# Patient Record
Sex: MALE | Race: WHITE | NOT HISPANIC OR LATINO | Employment: FULL TIME | URBAN - METROPOLITAN AREA
[De-identification: names, ages, dates, MRNs, and addresses within clinical notes are randomized per-mention and may not be internally consistent; named-entity substitution may affect disease eponyms.]

---

## 2021-12-05 ENCOUNTER — HOSPITAL ENCOUNTER (EMERGENCY)
Facility: HOSPITAL | Age: 46
Discharge: HOME | End: 2021-12-05
Attending: EMERGENCY MEDICINE
Payer: COMMERCIAL

## 2021-12-05 VITALS
SYSTOLIC BLOOD PRESSURE: 157 MMHG | HEART RATE: 84 BPM | BODY MASS INDEX: 27.32 KG/M2 | TEMPERATURE: 98.9 F | HEIGHT: 66 IN | DIASTOLIC BLOOD PRESSURE: 87 MMHG | OXYGEN SATURATION: 97 % | WEIGHT: 170 LBS | RESPIRATION RATE: 18 BRPM

## 2021-12-05 DIAGNOSIS — U07.1 COVID-19: Primary | ICD-10-CM

## 2021-12-05 PROCEDURE — 99282 EMERGENCY DEPT VISIT SF MDM: CPT | Performed by: PHYSICIAN ASSISTANT

## 2021-12-05 PROCEDURE — 99283 EMERGENCY DEPT VISIT LOW MDM: CPT

## 2022-05-15 ENCOUNTER — APPOINTMENT (EMERGENCY)
Dept: CT IMAGING | Facility: HOSPITAL | Age: 47
End: 2022-05-15
Payer: COMMERCIAL

## 2022-05-15 ENCOUNTER — HOSPITAL ENCOUNTER (EMERGENCY)
Facility: HOSPITAL | Age: 47
Discharge: HOME | End: 2022-05-15
Attending: EMERGENCY MEDICINE | Admitting: EMERGENCY MEDICINE
Payer: COMMERCIAL

## 2022-05-15 VITALS
HEART RATE: 85 BPM | TEMPERATURE: 98.4 F | SYSTOLIC BLOOD PRESSURE: 155 MMHG | DIASTOLIC BLOOD PRESSURE: 82 MMHG | RESPIRATION RATE: 19 BRPM | OXYGEN SATURATION: 98 %

## 2022-05-15 DIAGNOSIS — S39.012A STRAIN OF LUMBAR REGION, INITIAL ENCOUNTER: ICD-10-CM

## 2022-05-15 DIAGNOSIS — M54.50 ACUTE LOW BACK PAIN: Primary | ICD-10-CM

## 2022-05-15 DIAGNOSIS — R79.89 ELEVATED SERUM CREATININE: ICD-10-CM

## 2022-05-15 LAB
ALBUMIN SERPL BCP-MCNC: 4 G/DL (ref 3.5–5)
ALP SERPL-CCNC: 45 U/L (ref 46–116)
ALT SERPL W P-5'-P-CCNC: 63 U/L (ref 12–78)
ANION GAP SERPL CALCULATED.3IONS-SCNC: 8 MMOL/L (ref 4–13)
AST SERPL W P-5'-P-CCNC: 31 U/L (ref 5–45)
BASOPHILS # BLD AUTO: 0.04 THOUSANDS/ΜL (ref 0–0.1)
BASOPHILS NFR BLD AUTO: 1 % (ref 0–1)
BILIRUB SERPL-MCNC: 0.41 MG/DL (ref 0.2–1)
BILIRUB UR QL STRIP: NEGATIVE
BUN SERPL-MCNC: 24 MG/DL (ref 5–25)
CALCIUM SERPL-MCNC: 9.5 MG/DL (ref 8.3–10.1)
CHLORIDE SERPL-SCNC: 102 MMOL/L (ref 100–108)
CLARITY UR: CLEAR
CO2 SERPL-SCNC: 26 MMOL/L (ref 21–32)
COLOR UR: YELLOW
CREAT SERPL-MCNC: 1.31 MG/DL (ref 0.6–1.3)
EOSINOPHIL # BLD AUTO: 0.15 THOUSAND/ΜL (ref 0–0.61)
EOSINOPHIL NFR BLD AUTO: 3 % (ref 0–6)
ERYTHROCYTE [DISTWIDTH] IN BLOOD BY AUTOMATED COUNT: 11.8 % (ref 11.6–15.1)
GFR SERPL CREATININE-BSD FRML MDRD: 63 ML/MIN/1.73SQ M
GLUCOSE SERPL-MCNC: 111 MG/DL (ref 65–140)
GLUCOSE UR STRIP-MCNC: NEGATIVE MG/DL
HCT VFR BLD AUTO: 42.6 % (ref 36.5–49.3)
HGB BLD-MCNC: 15.3 G/DL (ref 12–17)
HGB UR QL STRIP.AUTO: NEGATIVE
IMM GRANULOCYTES # BLD AUTO: 0.02 THOUSAND/UL (ref 0–0.2)
IMM GRANULOCYTES NFR BLD AUTO: 0 % (ref 0–2)
KETONES UR STRIP-MCNC: NEGATIVE MG/DL
LEUKOCYTE ESTERASE UR QL STRIP: NEGATIVE
LIPASE SERPL-CCNC: 127 U/L (ref 73–393)
LYMPHOCYTES # BLD AUTO: 1.28 THOUSANDS/ΜL (ref 0.6–4.47)
LYMPHOCYTES NFR BLD AUTO: 26 % (ref 14–44)
MCH RBC QN AUTO: 31.4 PG (ref 26.8–34.3)
MCHC RBC AUTO-ENTMCNC: 35.9 G/DL (ref 31.4–37.4)
MCV RBC AUTO: 88 FL (ref 82–98)
MONOCYTES # BLD AUTO: 0.48 THOUSAND/ΜL (ref 0.17–1.22)
MONOCYTES NFR BLD AUTO: 10 % (ref 4–12)
NEUTROPHILS # BLD AUTO: 3.05 THOUSANDS/ΜL (ref 1.85–7.62)
NEUTS SEG NFR BLD AUTO: 60 % (ref 43–75)
NITRITE UR QL STRIP: NEGATIVE
NRBC BLD AUTO-RTO: 0 /100 WBCS
PH UR STRIP.AUTO: 6.5 [PH]
PLATELET # BLD AUTO: 229 THOUSANDS/UL (ref 149–390)
PMV BLD AUTO: 8.5 FL (ref 8.9–12.7)
POTASSIUM SERPL-SCNC: 4.2 MMOL/L (ref 3.5–5.3)
PROT SERPL-MCNC: 7 G/DL (ref 6.4–8.2)
PROT UR STRIP-MCNC: NEGATIVE MG/DL
RBC # BLD AUTO: 4.87 MILLION/UL (ref 3.88–5.62)
SODIUM SERPL-SCNC: 136 MMOL/L (ref 136–145)
SP GR UR STRIP.AUTO: 1.01 (ref 1–1.03)
UROBILINOGEN UR QL STRIP.AUTO: 0.2 E.U./DL
WBC # BLD AUTO: 5.02 THOUSAND/UL (ref 4.31–10.16)

## 2022-05-15 PROCEDURE — 80053 COMPREHEN METABOLIC PANEL: CPT | Performed by: PHYSICIAN ASSISTANT

## 2022-05-15 PROCEDURE — 85025 COMPLETE CBC W/AUTO DIFF WBC: CPT | Performed by: PHYSICIAN ASSISTANT

## 2022-05-15 PROCEDURE — 81003 URINALYSIS AUTO W/O SCOPE: CPT | Performed by: PHYSICIAN ASSISTANT

## 2022-05-15 PROCEDURE — 99285 EMERGENCY DEPT VISIT HI MDM: CPT | Performed by: PHYSICIAN ASSISTANT

## 2022-05-15 PROCEDURE — 96375 TX/PRO/DX INJ NEW DRUG ADDON: CPT

## 2022-05-15 PROCEDURE — 36415 COLL VENOUS BLD VENIPUNCTURE: CPT | Performed by: PHYSICIAN ASSISTANT

## 2022-05-15 PROCEDURE — 96374 THER/PROPH/DIAG INJ IV PUSH: CPT

## 2022-05-15 PROCEDURE — 99284 EMERGENCY DEPT VISIT MOD MDM: CPT

## 2022-05-15 PROCEDURE — 74176 CT ABD & PELVIS W/O CONTRAST: CPT

## 2022-05-15 PROCEDURE — 83690 ASSAY OF LIPASE: CPT | Performed by: PHYSICIAN ASSISTANT

## 2022-05-15 RX ORDER — FENTANYL CITRATE 50 UG/ML
50 INJECTION, SOLUTION INTRAMUSCULAR; INTRAVENOUS ONCE
Status: COMPLETED | OUTPATIENT
Start: 2022-05-15 | End: 2022-05-15

## 2022-05-15 RX ORDER — METHOCARBAMOL 500 MG/1
500 TABLET, FILM COATED ORAL ONCE
Status: COMPLETED | OUTPATIENT
Start: 2022-05-15 | End: 2022-05-15

## 2022-05-15 RX ORDER — KETOROLAC TROMETHAMINE 30 MG/ML
15 INJECTION, SOLUTION INTRAMUSCULAR; INTRAVENOUS ONCE
Status: COMPLETED | OUTPATIENT
Start: 2022-05-15 | End: 2022-05-15

## 2022-05-15 RX ORDER — METHOCARBAMOL 500 MG/1
500 TABLET, FILM COATED ORAL 2 TIMES DAILY
Qty: 20 TABLET | Refills: 0 | Status: SHIPPED | OUTPATIENT
Start: 2022-05-15

## 2022-05-15 RX ORDER — NAPROXEN 500 MG/1
500 TABLET ORAL 2 TIMES DAILY WITH MEALS
Qty: 30 TABLET | Refills: 0 | Status: SHIPPED | OUTPATIENT
Start: 2022-05-15

## 2022-05-15 RX ORDER — LIDOCAINE 50 MG/G
1 PATCH TOPICAL DAILY
Qty: 15 PATCH | Refills: 0 | Status: SHIPPED | OUTPATIENT
Start: 2022-05-15

## 2022-05-15 RX ADMIN — FENTANYL CITRATE 50 MCG: 50 INJECTION, SOLUTION INTRAMUSCULAR; INTRAVENOUS at 22:10

## 2022-05-15 RX ADMIN — METHOCARBAMOL 500 MG: 500 TABLET ORAL at 22:10

## 2022-05-15 RX ADMIN — KETOROLAC TROMETHAMINE 15 MG: 30 INJECTION, SOLUTION INTRAMUSCULAR at 22:10

## 2022-05-15 NOTE — Clinical Note
Christa Lopez was seen and treated in our emergency department on 5/15/2022  Diagnosis:     Mandie Henriquez  may return to work on return date  He may return on this date: 05/18/2022         If you have any questions or concerns, please don't hesitate to call        Kodi Shukla PA-C    ______________________________           _______________          _______________  Hospital Representative                              Date                                Time

## 2022-05-16 ENCOUNTER — NURSE TRIAGE (OUTPATIENT)
Dept: PHYSICAL THERAPY | Facility: OTHER | Age: 47
End: 2022-05-16

## 2022-05-16 DIAGNOSIS — M54.50 ACUTE RIGHT-SIDED LOW BACK PAIN, UNSPECIFIED WHETHER SCIATICA PRESENT: Primary | ICD-10-CM

## 2022-05-16 NOTE — TELEPHONE ENCOUNTER
Additional Information   Negative: Is this related to a work injury?  Negative: Is this related to an MVA?  Negative: Are you currently recieving homecare services? Background - Initial Assessment  Clinical complaint: Pain is right sided low back pain and radiates to right hip, and center tailbone area  Patient states he has minimal if any pain on the left side  Radiates down right leg  Feeling like a spasm  Pain started 2 weeks, however 5/15/22 pain increased  [facility] patient states it may have been from 3 weeks ago he tripped over sticks, and had more bending at work  Was seen in the ED for this pain  Was in PT years ago for high back pain     Date of onset: 5/2/22  Frequency of pain: constant  Quality of pain: sharp and stabbing    Protocols used: SL AMB COMPREHENSIVE SPINE PROGRAM PROTOCOL

## 2022-05-16 NOTE — ED PROVIDER NOTES
History  Chief Complaint   Patient presents with    Back Pain     Patient c/o right, lower sided back pain x 2 weeks, worse in the last 2 days  Hx of thoracic herniated disc  Patient is a 68-year-old male presenting to ED for evaluation of low back pain x2 weeks  Patient states that the pain is primarily in the right low back and radiates into the right hip and groin  He denies any falls, trauma, injuries or heavy lifting  The pain has been worsening over the past 2 days and is not relieved with Tylenol/Motrin  He also notes some pain in the right flank and right lower abdomen and is concerned about his kidney  He denies any history of kidney stones or kidney disease  He denies any fevers, chills, dysuria, hematuria, urgency or frequency  He denies any nausea, vomiting, diarrhea, constipation, penile discharge or testicular pain/swelling  He denies any numbness/weakness in lower extremities, bowel/bladder incontinence, urinary retention or saddle anesthesia  None       Past Medical History:   Diagnosis Date    Asthma        History reviewed  No pertinent surgical history  History reviewed  No pertinent family history  I have reviewed and agree with the history as documented  E-Cigarette/Vaping     E-Cigarette/Vaping Substances     Social History     Tobacco Use    Smoking status: Never Smoker    Smokeless tobacco: Never Used   Substance Use Topics    Alcohol use: Not Currently    Drug use: Not Currently       Review of Systems   Constitutional: Negative for chills, diaphoresis, fatigue and fever  HENT: Negative for congestion, ear pain, mouth sores, rhinorrhea, sinus pain, sore throat and trouble swallowing  Eyes: Negative for photophobia and visual disturbance  Respiratory: Negative for cough, chest tightness, shortness of breath and wheezing  Cardiovascular: Negative for chest pain, palpitations and leg swelling  Gastrointestinal: Positive for abdominal pain   Negative for blood in stool, constipation, diarrhea, nausea and vomiting  Genitourinary: Negative for dysuria, flank pain, frequency, hematuria and urgency  Musculoskeletal: Positive for arthralgias (Right hip pain ) and back pain  Negative for gait problem, joint swelling, myalgias and neck pain  Skin: Negative for color change, pallor and rash  Neurological: Negative for dizziness, syncope, speech difficulty, weakness, light-headedness, numbness and headaches  Psychiatric/Behavioral: Negative for confusion and sleep disturbance  All other systems reviewed and are negative  Physical Exam  Physical Exam  Vitals and nursing note reviewed  Constitutional:       General: He is awake  He is not in acute distress  Appearance: Normal appearance  He is well-developed  He is not ill-appearing or diaphoretic  HENT:      Head: Normocephalic and atraumatic  Right Ear: External ear normal       Left Ear: External ear normal       Nose: Nose normal       Mouth/Throat:      Lips: Pink  Mouth: Mucous membranes are moist    Eyes:      General: Lids are normal  No scleral icterus  Conjunctiva/sclera: Conjunctivae normal       Pupils: Pupils are equal, round, and reactive to light  Cardiovascular:      Rate and Rhythm: Normal rate and regular rhythm  Pulses: Normal pulses  Radial pulses are 2+ on the right side and 2+ on the left side  Heart sounds: Normal heart sounds, S1 normal and S2 normal    Pulmonary:      Effort: Pulmonary effort is normal  No accessory muscle usage  Breath sounds: Normal breath sounds  No stridor  No decreased breath sounds, wheezing, rhonchi or rales  Abdominal:      General: Abdomen is flat  Bowel sounds are normal  There is no distension  Palpations: Abdomen is soft  Tenderness: There is abdominal tenderness in the right lower quadrant  There is no right CVA tenderness, left CVA tenderness, guarding or rebound        Comments: Mild tenderness over the right mid and lower quadrants  No rebound tenderness, guarding or rigidity  Normal bowel sounds throughout  Musculoskeletal:      Cervical back: Normal, full passive range of motion without pain, normal range of motion and neck supple  No signs of trauma  No pain with movement  Normal range of motion  Thoracic back: Normal       Lumbar back: Spasms present  Positive right straight leg raise test  Negative left straight leg raise test       Right lower leg: No edema  Left lower leg: No edema  Comments: Tenderness to palpation of right-sided paraspinal muscles  No midline tenderness, deformities or step-offs  Strength 5/5 in bilateral lower extremities  Sensation intact, neurovascularly intact  Lymphadenopathy:      Cervical: No cervical adenopathy  Skin:     General: Skin is warm and dry  Capillary Refill: Capillary refill takes less than 2 seconds  Coloration: Skin is not cyanotic, jaundiced or pale  Neurological:      Mental Status: He is alert and oriented to person, place, and time  GCS: GCS eye subscore is 4  GCS verbal subscore is 5  GCS motor subscore is 6  Cranial Nerves: No dysarthria or facial asymmetry  Gait: Gait normal    Psychiatric:         Attention and Perception: Attention normal          Mood and Affect: Mood normal          Speech: Speech normal          Behavior: Behavior normal  Behavior is cooperative           Vital Signs  ED Triage Vitals   Temperature Pulse Respirations Blood Pressure SpO2   05/15/22 2047 05/15/22 2047 05/15/22 2047 05/15/22 2047 05/15/22 2047   98 4 °F (36 9 °C) 85 19 155/82 98 %      Temp Source Heart Rate Source Patient Position - Orthostatic VS BP Location FiO2 (%)   05/15/22 2047 05/15/22 2047 05/15/22 2047 05/15/22 2047 --   Oral Monitor Sitting Left arm       Pain Score       05/15/22 2210       10 - Worst Possible Pain           Vitals:    05/15/22 2047   BP: 155/82   Pulse: 85   Patient Position - Orthostatic VS: Sitting         Visual Acuity      ED Medications  Medications   ketorolac (TORADOL) injection 15 mg (15 mg Intravenous Given 5/15/22 2210)   methocarbamol (ROBAXIN) tablet 500 mg (500 mg Oral Given 5/15/22 2210)   fentanyl citrate (PF) 100 MCG/2ML 50 mcg (50 mcg Intravenous Given 5/15/22 2210)       Diagnostic Studies  Results Reviewed     Procedure Component Value Units Date/Time    UA w Reflex to Microscopic w Reflex to Culture [[id number]] Collected: 05/15/22 2255    Lab Status: Final result Specimen: Urine, Clean Catch Updated: 05/15/22 2301     Color, UA Yellow     Clarity, UA Clear     Specific Gravity, UA 1 015     pH, UA 6 5     Leukocytes, UA Negative     Nitrite, UA Negative     Protein, UA Negative mg/dl      Glucose, UA Negative mg/dl      Ketones, UA Negative mg/dl      Urobilinogen, UA 0 2 E U /dl      Bilirubin, UA Negative     Blood, UA Negative    Lipase [[id number]]  (Normal) Collected: 05/15/22 2207    Lab Status: Final result Specimen: Blood from Arm, Right Updated: 05/15/22 2233     Lipase 127 u/L     Comprehensive metabolic panel [[id number]]  (Abnormal) Collected: 05/15/22 2207    Lab Status: Final result Specimen: Blood from Arm, Right Updated: 05/15/22 2233     Sodium 136 mmol/L      Potassium 4 2 mmol/L      Chloride 102 mmol/L      CO2 26 mmol/L      ANION GAP 8 mmol/L      BUN 24 mg/dL      Creatinine 1 31 mg/dL      Glucose 111 mg/dL      Calcium 9 5 mg/dL      AST 31 U/L      ALT 63 U/L      Alkaline Phosphatase 45 U/L      Total Protein 7 0 g/dL      Albumin 4 0 g/dL      Total Bilirubin 0 41 mg/dL      eGFR 63 ml/min/1 73sq m     Narrative:      Jason guidelines for Chronic Kidney Disease (CKD):     Stage 1 with normal or high GFR (GFR > 90 mL/min/1 73 square meters)    Stage 2 Mild CKD (GFR = 60-89 mL/min/1 73 square meters)    Stage 3A Moderate CKD (GFR = 45-59 mL/min/1 73 square meters)    Stage 3B Moderate CKD (GFR = 30-44 mL/min/1 73 square meters)    Stage 4 Severe CKD (GFR = 15-29 mL/min/1 73 square meters)    Stage 5 End Stage CKD (GFR <15 mL/min/1 73 square meters)  Note: GFR calculation is accurate only with a steady state creatinine    CBC and differential [[id number]]  (Abnormal) Collected: 05/15/22 2207    Lab Status: Final result Specimen: Blood from Arm, Right Updated: 05/15/22 2220     WBC 5 02 Thousand/uL      RBC 4 87 Million/uL      Hemoglobin 15 3 g/dL      Hematocrit 42 6 %      MCV 88 fL      MCH 31 4 pg      MCHC 35 9 g/dL      RDW 11 8 %      MPV 8 5 fL      Platelets 208 Thousands/uL      nRBC 0 /100 WBCs      Neutrophils Relative 60 %      Immat GRANS % 0 %      Lymphocytes Relative 26 %      Monocytes Relative 10 %      Eosinophils Relative 3 %      Basophils Relative 1 %      Neutrophils Absolute 3 05 Thousands/µL      Immature Grans Absolute 0 02 Thousand/uL      Lymphocytes Absolute 1 28 Thousands/µL      Monocytes Absolute 0 48 Thousand/µL      Eosinophils Absolute 0 15 Thousand/µL      Basophils Absolute 0 04 Thousands/µL                  CT abdomen pelvis wo contrast   Final Result by Meg Singh MD (05/15 2329)      Normal appendix  No evidence of acute intra-abdominal or pelvic pathology            Workstation performed: [id number]         CT recon only lumbar spine   Final Result by Meg Singh MD (05/15 1376)      No fracture or traumatic subluxation  Workstation performed: [id number]                    Procedures  Procedures         ED Course  ED Course as of 05/16/22 1240   Sun May 15, 2022   2243 Creatinine(!): 1 31  Prior from 11/2021 was 1 1  SBIRT 20yo+    Flowsheet Row Most Recent Value   SBIRT (25 yo +)    In order to provide better care to our patients, we are screening all of our patients for alcohol and drug use  Would it be okay to ask you these screening questions? Yes Filed at: 05/15/2022 2119   Initial Alcohol Screen: US AUDIT-C     1   How often do you have a drink containing alcohol? 0 Filed at: 05/15/2022 2119   2  How many drinks containing alcohol do you have on a typical day you are drinking? 0 Filed at: 05/15/2022 2119   3a  Male UNDER 65: How often do you have five or more drinks on one occasion? 0 Filed at: 05/15/2022 2119   3b  FEMALE Any Age, or MALE 65+: How often do you have 4 or more drinks on one occassion? 0 Filed at: 05/15/2022 2119   Audit-C Score 0 Filed at: 05/15/2022 2119   CHENG: How many times in the past year have you    Used an illegal drug or used a prescription medication for non-medical reasons? Never Filed at: 05/15/2022 2119                    MDM  Number of Diagnoses or Management Options  Acute low back pain  Elevated serum creatinine  Strain of lumbar region, initial encounter  Diagnosis management comments: Patient is a 24-year-old male presenting to ED for evaluation of low back pain x2 weeks  Labs notable for a mild elevation in creatinine but are otherwise unremarkable  Urine shows no evidence of infection or RBCs  CT abdomen/pelvis and lumbar spine showed no acute abnormalities  Patient had some improvement after medications given in the ED  Will provide Comprehensive Spine program referral and send additional pain medications and muscle relaxers to patient's pharmacy  Advised prompt follow-up with PCP or return to the ED for new/worsening symptoms  A work note was provided at patient's request  We discussed the symptoms which are most concerning (saddle anesthesia, urinary or bowel incontinence or retention, changing or worsening pain) that necessitate immediate return  The management plan was discussed in detail with the patient at bedside and all questions were answered  Strict ED return instructions were discussed at bedside  Prior to discharge, both verbal and written instructions were provided  We discussed the signs and symptoms that should prompt the patient to return to the ED   All questions were answered and the patient was comfortable with the plan of care and discharged home  The patient agrees to return to the Emergency Department for concerns and/or progression of illness  Amount and/or Complexity of Data Reviewed  Clinical lab tests: ordered and reviewed  Tests in the radiology section of CPT®: reviewed and ordered    Patient Progress  Patient progress: stable      Disposition  Final diagnoses:   Acute low back pain   Strain of lumbar region, initial encounter   Elevated serum creatinine     Time reflects when diagnosis was documented in both MDM as applicable and the Disposition within this note     Time User Action Codes Description Comment    5/15/2022 11:40 PM Jayashree Mcgarry [M54 [id number]] Acute low back pain     5/15/2022 11:40 PM Jayashree Mcgarry [S39 012A] Strain of lumbar region, initial encounter     5/15/2022 11:40 PM Jayashree Mcgarry [[id number]] Elevated serum creatinine       ED Disposition     ED Disposition   Discharge    Condition   Stable    Date/Time   Sun May 15, 2022 11:40 PM    Comment   Darylene Holts discharge to home/self care                 Follow-up Information     Follow up With Specialties Details Why Contact Info Additional [address], DO Family Medicine Schedule an appointment as soon as possible for a visit   [address] [facility] [address] [facility] [address]  [phone]       [facility] Comprehensive Spine Program Physical Therapy Schedule an appointment as soon as possible for a visit   [phone]    [facility] [address] [facility] Emergency Department Emergency Medicine  If symptoms worsen [address] Emergency Department, [address] [facility], [address] [place] [address], [facility]          Discharge Medication List as of 5/15/2022 11:43 PM      START taking these medications    Details   lidocaine (Lidoderm) 5 % Apply 1 patch topically in the morning  Remove & Discard patch within 12 hours or as directed by MD , Starting Sun 5/15/2022, Normal      methocarbamol (ROBAXIN) 500 mg tablet Take 1 tablet (500 mg total) by mouth in the morning and 1 tablet (500 mg total) in the evening , Starting Sun 5/15/2022, Normal      naproxen (Naprosyn) 500 mg tablet Take 1 tablet (500 mg total) by mouth in the morning and 1 tablet (500 mg total) in the evening  Take with meals  , Starting Sun 5/15/2022, Normal                 PDMP Review     None          ED Provider  Electronically Signed by           Clara German PA-C  05/16/22 6055

## 2022-05-16 NOTE — TELEPHONE ENCOUNTER
Additional Information   Negative: Has the patient had unexplained weight loss?  Negative: Does the patient have a fever?  Negative: Is the patient experiencing blood in sputum?  Negative: Is the patient experiencing urine retention?  Negative: Is the patient experiencing acute drop foot or paralysis?  Negative: Has the patient experienced major trauma? (fall from height, high speed collision, direct blow to spine) and is also experiencing nausea, light-headedness, or loss of consciousness?  Negative: Is this a chronic condition? Protocols used: SL AMB COMPREHENSIVE SPINE PROGRAM PROTOCOL    This RN did review in detail the Comprehensive Spine Program and what we can provide for their back pain  Patient is agreeable to being triaged by this RN and would like to proceed with Physical Therapy  Referral was placed for Physical Therapy at [facility]  Patients information was sent to the  to make evaluation appointment  Patient made aware that the PT office  will be calling to schedule the appointment  Patient was provided with the phone number to the PT office  No further questions and/or concerns were voiced by the patient at this time  Patient states understanding of the referral that was placed

## 2022-09-30 ENCOUNTER — TELEPHONE (OUTPATIENT)
Dept: UROLOGY | Facility: CLINIC | Age: 47
End: 2022-09-30

## 2022-09-30 ENCOUNTER — OFFICE VISIT (OUTPATIENT)
Dept: UROLOGY | Facility: CLINIC | Age: 47
End: 2022-09-30
Payer: COMMERCIAL

## 2022-09-30 VITALS
HEIGHT: 66 IN | HEART RATE: 68 BPM | SYSTOLIC BLOOD PRESSURE: 122 MMHG | WEIGHT: 174 LBS | BODY MASS INDEX: 27.97 KG/M2 | OXYGEN SATURATION: 99 % | DIASTOLIC BLOOD PRESSURE: 76 MMHG

## 2022-09-30 DIAGNOSIS — R39.15 URINARY URGENCY: Primary | ICD-10-CM

## 2022-09-30 DIAGNOSIS — G47.33 OSA (OBSTRUCTIVE SLEEP APNEA): ICD-10-CM

## 2022-09-30 DIAGNOSIS — Z12.5 SCREENING FOR PROSTATE CANCER: ICD-10-CM

## 2022-09-30 LAB
POST-VOID RESIDUAL VOLUME, ML POC: 225 ML
SL AMB  POCT GLUCOSE, UA: NORMAL
SL AMB LEUKOCYTE ESTERASE,UA: NORMAL
SL AMB POCT BILIRUBIN,UA: NORMAL
SL AMB POCT BLOOD,UA: NORMAL
SL AMB POCT CLARITY,UA: CLEAR
SL AMB POCT COLOR,UA: NORMAL
SL AMB POCT KETONES,UA: NORMAL
SL AMB POCT NITRITE,UA: NORMAL
SL AMB POCT PH,UA: 6.5
SL AMB POCT SPECIFIC GRAVITY,UA: 1
SL AMB POCT URINE PROTEIN: NORMAL
SL AMB POCT UROBILINOGEN: 0.2

## 2022-09-30 PROCEDURE — 81002 URINALYSIS NONAUTO W/O SCOPE: CPT | Performed by: PHYSICIAN ASSISTANT

## 2022-09-30 PROCEDURE — 99204 OFFICE O/P NEW MOD 45 MIN: CPT | Performed by: PHYSICIAN ASSISTANT

## 2022-09-30 PROCEDURE — 51798 US URINE CAPACITY MEASURE: CPT | Performed by: PHYSICIAN ASSISTANT

## 2022-09-30 RX ORDER — LIDOCAINE HYDROCHLORIDE 40 MG/ML
1 SOLUTION TOPICAL AS NEEDED
COMMUNITY
Start: 2021-10-03 | End: 2022-10-03

## 2022-09-30 RX ORDER — FENOFIBRATE 145 MG/1
TABLET, COATED ORAL DAILY
COMMUNITY
Start: 2022-08-01

## 2022-09-30 RX ORDER — FLUTICASONE FUROATE 100 UG/1
POWDER RESPIRATORY (INHALATION) DAILY
COMMUNITY
Start: 2022-06-23

## 2022-09-30 RX ORDER — OLOPATADINE HYDROCHLORIDE 2 MG/ML
SOLUTION/ DROPS OPHTHALMIC
COMMUNITY
Start: 2022-09-22

## 2022-09-30 RX ORDER — ALBUTEROL SULFATE 2.5 MG/3ML
2.5 SOLUTION RESPIRATORY (INHALATION) AS NEEDED
COMMUNITY
Start: 2021-10-05 | End: 2022-10-05

## 2022-09-30 RX ORDER — OXYBUTYNIN CHLORIDE 10 MG/1
10 TABLET, EXTENDED RELEASE ORAL
COMMUNITY

## 2022-09-30 RX ORDER — FLUTICASONE PROPIONATE 50 MCG
SPRAY, SUSPENSION (ML) NASAL DAILY
COMMUNITY
Start: 2022-08-01

## 2022-09-30 NOTE — TELEPHONE ENCOUNTER
UDS scheduled for 1/5/23 @ [address] [facility] office  Please confirm with patient

## 2022-09-30 NOTE — TELEPHONE ENCOUNTER
Return for cysto and UDS     Need UDS appt please   Will schedule cysto after UDS along with result appt -

## 2022-09-30 NOTE — PROGRESS NOTES
9/30/2022      Chief Complaint   Patient presents with    Urinary Urgency         Assessment and Plan    50 y o  male -- New patient    1  Difficulty urinating  2  Incomplete bladder emptying  - AUA today 23   - UA today negative for nitrites, leukocytes, blood  - PVR today 225 mL, repeat 59 mL  - Discussed conservative measures  - Recommend cystoscopy and UDS for further workup  - Call with any questions or concerns in the meantime  - All questions answered; patient understands and agrees with plan    3  Prostate cancer screening   - PSA 2020 was 0 37  - SANAZ today benign  - PSA in near future    4  ARELIS on CPAP      History of Present Illness  Wagner Barbour is a 50 y o  male new patient here for evaluation of urinary frequency  Patient has always had difficulty urinating  States he urinates 15-20 times per day and multiple times per hour  Has previously seen [facility]  Currently taking oxybutynin 10 mg daily for the past 6 years  States he never feels empty when done urinating and feels as if he needs to do Kegel exercises to urinate  Denies gross hematuria, flank pain, suprapubic pressure, fever, chills, nausea, vomiting  Denies family history of  malignancies  Review of Systems   Constitutional: Negative for activity change, appetite change, chills and fever  HENT: Negative for congestion and trouble swallowing  Respiratory: Negative for cough and shortness of breath  Cardiovascular: Negative for chest pain, palpitations and leg swelling  Gastrointestinal: Negative for abdominal pain, constipation, diarrhea, nausea and vomiting  Genitourinary: Positive for difficulty urinating, frequency and urgency  Negative for dysuria, flank pain and hematuria  Musculoskeletal: Negative for back pain and gait problem  Skin: Negative for wound  Allergic/Immunologic: Negative for immunocompromised state  Neurological: Negative for dizziness and syncope     Hematological: Does not bruise/bleed easily  Psychiatric/Behavioral: Negative for confusion  All other systems reviewed and are negative  AUA SYMPTOM SCORE    Flowsheet Row Most Recent Value   AUA SYMPTOM SCORE    How often have you had a sensation of not emptying your bladder completely after you finished urinating? 2 (P)     How often have you had to urinate again less than two hours after you finished urinating? 4 (P)     How often have you found you stopped and started again several times when you urinate? 3 (P)     How often have you found it difficult to postpone urination? 4 (P)     How often have you had a weak urinary stream? 4 (P)     How often have you had to push or strain to begin urination? 1 (P)     How many times did you most typically get up to urinate from the time you went to bed at night until the time you got up in the morning? 5 (P)     Quality of Life: If you were to spend the rest of your life with your urinary condition just the way it is now, how would you feel about that? 3 (P)     AUA SYMPTOM SCORE 23 (P)            Vitals  Vitals:    09/30/22 0922   BP: 122/76   Pulse: 68   SpO2: 99%   Weight: 78 9 kg (174 lb)   Height: 5' 6" (1 676 m)       Physical Exam  Constitutional:       General: He is not in acute distress  Appearance: Normal appearance  He is not ill-appearing, toxic-appearing or diaphoretic  HENT:      Head: Normocephalic  Nose: No congestion  Eyes:      General: No scleral icterus  Right eye: No discharge  Left eye: No discharge  Conjunctiva/sclera: Conjunctivae normal       Pupils: Pupils are equal, round, and reactive to light  Pulmonary:      Effort: Pulmonary effort is normal    Genitourinary:     Comments: Prostate smooth, symmetrical, no palpable nodules  Musculoskeletal:      Cervical back: Normal range of motion  Skin:     General: Skin is warm and dry  Coloration: Skin is not jaundiced or pale        Findings: No bruising, erythema, lesion or rash  Neurological:      General: No focal deficit present  Mental Status: He is alert and oriented to person, place, and time  Mental status is at baseline  Gait: Gait normal    Psychiatric:         Mood and Affect: Mood normal          Behavior: Behavior normal          Thought Content: Thought content normal          Judgment: Judgment normal            Past History  Past Medical History:   Diagnosis Date    Asthma     Erectile dysfunction 2013    Urinary urgency      Social History     Socioeconomic History    Marital status: /Civil Union     Spouse name: None    Number of children: None    Years of education: None    Highest education level: None   Occupational History    None   Tobacco Use    Smoking status: Never Smoker    Smokeless tobacco: Never Used   Vaping Use    Vaping Use: Never used   Substance and Sexual Activity    Alcohol use: Not Currently    Drug use: Never    Sexual activity: Yes     Partners: Female     Birth control/protection: Male Sterilization   Other Topics Concern    None   Social History Narrative    None     Social Determinants of Health     Financial Resource Strain: Not on file   Food Insecurity: Not on file   Transportation Needs: Not on file   Physical Activity: Not on file   Stress: Not on file   Social Connections: Not on file   Intimate Partner Violence: Not on file   Housing Stability: Not on file     Social History     Tobacco Use   Smoking Status Never Smoker   Smokeless Tobacco Never Used     History reviewed  No pertinent family history      The following portions of the patient's history were reviewed and updated as appropriate: allergies, current medications, past medical history, past social history, past surgical history and problem list     Results  Recent Results (from the past 1 hour(s))   POCT urine dip    Collection Time: 09/30/22  9:34 AM   Result Value Ref Range    LEUKOCYTE ESTERASE,UA -     NITRITE,UA -     SL AMB POCT UROBILINOGEN 0 2     POCT URINE PROTEIN -      PH,UA 6 5     BLOOD,UA -     SPECIFIC GRAVITY,UA 1 005     KETONES,UA -     BILIRUBIN,UA -     GLUCOSE, UA -      COLOR,UA faint yellow     CLARITY,UA clear    POCT Measure PVR    Collection Time: 09/30/22  9:36 AM   Result Value Ref Range    POST-VOID RESIDUAL VOLUME, ML  mL   ]  No results found for: PSA  Lab Results   Component Value Date    CALCIUM 9 5 05/15/2022    K 4 2 05/15/2022    CO2 26 05/15/2022     05/15/2022    BUN 24 05/15/2022    CREATININE 1 31 (H) 05/15/2022     Lab Results   Component Value Date    WBC 5 02 05/15/2022    HGB 15 3 05/15/2022    HCT 42 6 05/15/2022    MCV 88 05/15/2022     05/15/2022       Newborn Risk

## 2022-10-03 NOTE — TELEPHONE ENCOUNTER
Cysto and UDS review scheduled for the following week -  [employer] message sent: because we cannot get through  [employer] monitor for retun mess  I know we confirmed your phone number when you were here last , but it still says "this is not a working number " when we call     We scheduled the Urodynamic study  for the next available in [place] which is January 5th , 2023 at 8:30 AM   The follow up appointment is on 1/13/2023 in [facility] at [facility]      Will monitor for return message

## 2022-10-06 ENCOUNTER — TELEPHONE (OUTPATIENT)
Dept: PULMONOLOGY | Facility: CLINIC | Age: 47
End: 2022-10-06

## 2022-10-06 NOTE — TELEPHONE ENCOUNTER
Called pt to schedule ss consult from referral but pt states doctor must have misunderstood his needs because he already had a sleep study and a sleep apnea diagnosis and machine   Machine recalled  He didn't want to go down that road again and requested an orthodontist referral for his breathing issues

## 2023-01-05 ENCOUNTER — PROCEDURE VISIT (OUTPATIENT)
Dept: UROLOGY | Facility: CLINIC | Age: 48
End: 2023-01-05

## 2023-01-05 DIAGNOSIS — N36.44 DETRUSOR SPHINCTER DYSSYNERGIA: Primary | ICD-10-CM

## 2023-01-05 DIAGNOSIS — R39.15 URGENCY OF URINATION: ICD-10-CM

## 2023-01-05 LAB
SL AMB  POCT GLUCOSE, UA: NEGATIVE
SL AMB LEUKOCYTE ESTERASE,UA: NEGATIVE
SL AMB POCT BILIRUBIN,UA: NEGATIVE
SL AMB POCT BLOOD,UA: NEGATIVE
SL AMB POCT CLARITY,UA: CLEAR
SL AMB POCT COLOR,UA: YELLOW
SL AMB POCT KETONES,UA: NEGATIVE
SL AMB POCT NITRITE,UA: NEGATIVE
SL AMB POCT PH,UA: 5
SL AMB POCT SPECIFIC GRAVITY,UA: 1.02
SL AMB POCT URINE PROTEIN: ABNORMAL
SL AMB POCT UROBILINOGEN: NEGATIVE

## 2023-01-05 NOTE — PROGRESS NOTES
CC: " Weak stream, I don't empty all the way, and go frequently  Urgency when I feel like I have to go, and sometimes only a little comes out"    On Oxybutynin, last dose 2 days ago    Denies pain / burning with voiding      Uroflow:  Unable to void prior to test, straight catheterized for 50 ml    CMG:       Position:  sitting           Fill sensation:   77 ml,  pdet 2 cm H2O         First urge:        154 ml,  pdet 0 cm H2O       Normal urge:   207 ml,  pdet 2  cm H2O           Must urge:         Not verbalized       Permission to void:  501 ml,  pdet -9  cm                       H2O           Max pdet during void    33 cm H2O       Voided volume:    515 ml    Bladder stability:   stable              Compliance:  normal         Sphincter function:  No CANDICE provoked    EMG activity:       Normal during filling,        abnormal during voiding    Comments:   Sensory urgency with normal urge   Stable bladder   + EMG during void    Urodynamics    Date/Time: 1/5/2023 8:30 AM  Performed by: Gagan Grey RN  Authorized by: Alireza Mulligan MD   Universal Protocol:  Consent: Verbal consent obtained  Written consent obtained    Risks and benefits: risks, benefits and alternatives were discussed  Consent given by: patient  Patient understanding: patient states understanding of the procedure being performed  Patient consent: the patient's understanding of the procedure matches consent given  Patient identity confirmed: verbally with patient    Procedure - Urodynamics:  Procedure details: CMG and EMG      Voiding Pressure Study: Yes    Intra-abdominal Voiding Pressure Study: Yes    Post-procedure:     Patient tolerance: Patient tolerated procedure well with no immediate complications

## 2023-02-22 ENCOUNTER — PROCEDURE VISIT (OUTPATIENT)
Dept: UROLOGY | Facility: CLINIC | Age: 48
End: 2023-02-22

## 2023-02-22 VITALS
HEIGHT: 66 IN | WEIGHT: 182 LBS | SYSTOLIC BLOOD PRESSURE: 164 MMHG | HEART RATE: 64 BPM | OXYGEN SATURATION: 99 % | BODY MASS INDEX: 29.25 KG/M2 | DIASTOLIC BLOOD PRESSURE: 82 MMHG

## 2023-02-22 DIAGNOSIS — Z12.5 SCREENING FOR PROSTATE CANCER: ICD-10-CM

## 2023-02-22 DIAGNOSIS — R39.15 URINARY URGENCY: Primary | ICD-10-CM

## 2023-02-22 LAB
SL AMB  POCT GLUCOSE, UA: NORMAL
SL AMB LEUKOCYTE ESTERASE,UA: NORMAL
SL AMB POCT BILIRUBIN,UA: NORMAL
SL AMB POCT BLOOD,UA: NORMAL
SL AMB POCT CLARITY,UA: CLEAR
SL AMB POCT COLOR,UA: NORMAL
SL AMB POCT KETONES,UA: NORMAL
SL AMB POCT NITRITE,UA: NORMAL
SL AMB POCT PH,UA: 6.5
SL AMB POCT SPECIFIC GRAVITY,UA: 1
SL AMB POCT URINE PROTEIN: NORMAL
SL AMB POCT UROBILINOGEN: 0.2

## 2023-02-22 RX ORDER — SULFAMETHOXAZOLE AND TRIMETHOPRIM 800; 160 MG/1; MG/1
1 TABLET ORAL EVERY 12 HOURS SCHEDULED
Qty: 4 TABLET | Refills: 0 | Status: SHIPPED | OUTPATIENT
Start: 2023-02-22 | End: 2023-02-24

## 2023-02-22 NOTE — PROGRESS NOTES
Assessment/Plan:  1  Urinary urgency-patient has demonstrated the ability to adequately empty his bladder on PVR determination before  He also noted that he is not sure whether Ditropan is helping him or not  Patient will discontinue Ditropan and see if his symptoms worsen  Right now the patient notes that his symptom complex is not severely bothersome and he can certainly live with the symptoms  If symptoms become more problematic either reinstitution of Ditropan, another antimuscarinic, a beta 3 agonist or combination thereof will take place  I reviewed the patient's urodynamics with him and other than a slightly premature urge to void I do not identify any element of the detrusor instability or evidence of outlet obstruction  #2  Prostate cancer screening  SANAZ and PSA not suspicious for malignancy-repeat 1 year  No problem-specific Assessment & Plan notes found for this encounter  Diagnoses and all orders for this visit:    Urinary urgency  -     POCT urine dip  -     Comprehensive metabolic panel; Future  -     sulfamethoxazole-trimethoprim (BACTRIM DS) 800-160 mg per tablet; Take 1 tablet by mouth every 12 (twelve) hours for 2 days    Screening for prostate cancer  -     PSA Total, Diagnostic; Future          Subjective:      Patient ID: Gurjit Brock is a 50 y o  male  HPI  39-year-old male with urgency and frequency of urination notes that he also has urgency to double void but often when he tries to void very little comes out  He presents for cystoscopy and review of urodynamics  The patient has been taking Ditropan XL 10 mg daily for quite a number of years and is unsure whether this is helping him or not  The patient notes that the symptoms are not severe and that he can actually live with the symptoms if necessary    The following portions of the patient's history were reviewed and updated as appropriate: allergies, current medications, past family history, past medical history, past social history, past surgical history and problem list     Review of Systems   Genitourinary: Positive for frequency and urgency  Musculoskeletal: Positive for myalgias  All other systems reviewed and are negative  Objective:      /82   Pulse 64   Ht 5' 6" (1 676 m)   Wt 82 6 kg (182 lb)   SpO2 99%   BMI 29 38 kg/m²          Physical Exam  Vitals reviewed  Constitutional:       General: He is not in acute distress  Appearance: Normal appearance  He is obese  He is not ill-appearing, toxic-appearing or diaphoretic  HENT:      Head: Normocephalic and atraumatic  Nose: Nose normal       Mouth/Throat:      Mouth: Mucous membranes are moist    Eyes:      Extraocular Movements: Extraocular movements intact  Pulmonary:      Effort: Pulmonary effort is normal  No respiratory distress  Abdominal:      General: There is no distension  Palpations: Abdomen is soft  Tenderness: There is no abdominal tenderness  There is no guarding  Genitourinary:     Penis: Normal        Testes: Normal       Prostate: Normal       Rectum: Normal    Musculoskeletal:         General: Normal range of motion  Cervical back: Neck supple  Skin:     General: Skin is dry  Neurological:      Mental Status: He is alert and oriented to person, place, and time  Psychiatric:         Mood and Affect: Mood normal          Behavior: Behavior normal          Thought Content: Thought content normal          Judgment: Judgment normal                 Cystoscopy     Date/Time 2/22/2023 2:43 PM     Performed by  Ruth Jones MD     Authorized by Ruth Jones MD      Universal Protocol:  Consent: Verbal consent obtained  Written consent obtained    Risks and benefits: risks, benefits and alternatives were discussed  Consent given by: patient  Patient understanding: patient states understanding of the procedure being performed  Patient consent: the patient's understanding of the procedure matches consent given  Procedure consent: procedure consent matches procedure scheduled  Required items: required blood products, implants, devices, and special equipment available  Patient identity confirmed: verbally with patient        Procedure Details:  Procedure type: cystoscopy    Patient tolerance: Patient tolerated the procedure well with no immediate complications    Additional Procedure Details: With the patient in the supine position after prepping the urethral meatus with Betadine 2% lidocaine lubricant was instilled per urethra and left indwelling for 5 minutes  Flexible video cystourethroscopy revealed a normal bladder without intrinsic lesion or extrinsic mass compression, normal urinary ureteral orificeswith clear reflux and normal position bilaterally  No intrinsic lesions of the bladder or extrinsic mass compression was appreciated and the bladder appeared normal   Prostatic urethra was not obstructed by visualization with only minimal enlargement of the lateral lobes of the prostate  Mucosa appeared normal   Anterior urethra was normal without stricture or lesion  The cystoscope was removed without incident and the patient recovered uneventfully voiding down to a PVR of only 17 cc  There were no complications

## 2023-02-22 NOTE — LETTER
February 22, 2023     Libby Geronimo DO  [employer] [facility] [address] [facility]  [address]    Patient: Chaz Fofana   YOB: 1975   Date of Visit: 2/22/2023       Dear Dr Sugey Faith: Thank you for referring Chaz Fofana to me for evaluation  Below are my notes for this consultation  If you have questions, please do not hesitate to call me  I look forward to following your patient along with you  Sincerely,        Kashif Rodriguez MD        CC: No Recipients  Kashif Rodriguez MD  2/22/2023  2:46 PM  Sign when Signing Visit  Assessment/Plan:  1  Urinary urgency-patient has demonstrated the ability to adequately empty his bladder on PVR determination before  He also noted that he is not sure whether Ditropan is helping him or not  Patient will discontinue Ditropan and see if his symptoms worsen  Right now the patient notes that his symptom complex is not severely bothersome and he can certainly live with the symptoms  If symptoms become more problematic either reinstitution of Ditropan, another antimuscarinic, a beta 3 agonist or combination thereof will take place  I reviewed the patient's urodynamics with him and other than a slightly premature urge to void I do not identify any element of the detrusor instability or evidence of outlet obstruction  #2  Prostate cancer screening  SANAZ and PSA not suspicious for malignancy-repeat 1 year  No problem-specific Assessment & Plan notes found for this encounter  Diagnoses and all orders for this visit:    Urinary urgency  -     POCT urine dip  -     Comprehensive metabolic panel; Future  -     sulfamethoxazole-trimethoprim (BACTRIM DS) 800-160 mg per tablet; Take 1 tablet by mouth every 12 (twelve) hours for 2 days    Screening for prostate cancer  -     PSA Total, Diagnostic; Future         Subjective:     Patient ID: Chaz Fofana is a 50 y o  male      HPI  78-year-old male with urgency and frequency of urination notes that he also has urgency to double void but often when he tries to void very little comes out  He presents for cystoscopy and review of urodynamics  The patient has been taking Ditropan XL 10 mg daily for quite a number of years and is unsure whether this is helping him or not  The patient notes that the symptoms are not severe and that he can actually live with the symptoms if necessary  The following portions of the patient's history were reviewed and updated as appropriate: allergies, current medications, past family history, past medical history, past social history, past surgical history and problem list     Review of Systems   Genitourinary: Positive for frequency and urgency  Musculoskeletal: Positive for myalgias  All other systems reviewed and are negative  Objective:      /82   Pulse 64   Ht 5' 6" (1 676 m)   Wt 82 6 kg (182 lb)   SpO2 99%   BMI 29 38 kg/m²         Physical Exam  Vitals reviewed  Constitutional:       General: He is not in acute distress  Appearance: Normal appearance  He is obese  He is not ill-appearing, toxic-appearing or diaphoretic  HENT:      Head: Normocephalic and atraumatic  Nose: Nose normal       Mouth/Throat:      Mouth: Mucous membranes are moist    Eyes:      Extraocular Movements: Extraocular movements intact  Pulmonary:      Effort: Pulmonary effort is normal  No respiratory distress  Abdominal:      General: There is no distension  Palpations: Abdomen is soft  Tenderness: There is no abdominal tenderness  There is no guarding  Genitourinary:     Penis: Normal        Testes: Normal       Prostate: Normal       Rectum: Normal    Musculoskeletal:         General: Normal range of motion  Cervical back: Neck supple  Skin:     General: Skin is dry  Neurological:      Mental Status: He is alert and oriented to person, place, and time     Psychiatric:         Mood and Affect: Mood normal          Behavior: Behavior normal  Thought Content: Thought content normal          Judgment: Judgment normal                Cystoscopy     Date/Time 2/22/2023 2:43 PM     Performed by  Eugenio Martinez MD     Authorized by Eugenio Martinez MD      Universal Protocol:  Consent: Verbal consent obtained  Written consent obtained  Risks and benefits: risks, benefits and alternatives were discussed  Consent given by: patient  Patient understanding: patient states understanding of the procedure being performed  Patient consent: the patient's understanding of the procedure matches consent given  Procedure consent: procedure consent matches procedure scheduled  Required items: required blood products, implants, devices, and special equipment available  Patient identity confirmed: verbally with patient        Procedure Details:  Procedure type: cystoscopy    Patient tolerance: Patient tolerated the procedure well with no immediate complications    Additional Procedure Details: With the patient in the supine position after prepping the urethral meatus with Betadine 2% lidocaine lubricant was instilled per urethra and left indwelling for 5 minutes  Flexible video cystourethroscopy revealed a normal bladder without intrinsic lesion or extrinsic mass compression, normal urinary ureteral orificeswith clear reflux and normal position bilaterally  No intrinsic lesions of the bladder or extrinsic mass compression was appreciated and the bladder appeared normal   Prostatic urethra was not obstructed by visualization with only minimal enlargement of the lateral lobes of the prostate  Mucosa appeared normal   Anterior urethra was normal without stricture or lesion  The cystoscope was removed without incident and the patient recovered uneventfully voiding down to a PVR of only 17 cc  There were no complications

## 2023-11-22 ENCOUNTER — OFFICE VISIT (OUTPATIENT)
Dept: FAMILY MEDICINE CLINIC | Facility: CLINIC | Age: 48
End: 2023-11-22
Payer: COMMERCIAL

## 2023-11-22 VITALS
HEART RATE: 81 BPM | DIASTOLIC BLOOD PRESSURE: 80 MMHG | SYSTOLIC BLOOD PRESSURE: 132 MMHG | WEIGHT: 182.4 LBS | BODY MASS INDEX: 29.32 KG/M2 | HEIGHT: 66 IN | OXYGEN SATURATION: 97 %

## 2023-11-22 DIAGNOSIS — R10.32 LEFT LOWER QUADRANT ABDOMINAL PAIN: ICD-10-CM

## 2023-11-22 DIAGNOSIS — E78.2 MIXED HYPERLIPIDEMIA: Primary | ICD-10-CM

## 2023-11-22 DIAGNOSIS — J45.909 UNCOMPLICATED ASTHMA, UNSPECIFIED ASTHMA SEVERITY, UNSPECIFIED WHETHER PERSISTENT: ICD-10-CM

## 2023-11-22 DIAGNOSIS — K58.9 IRRITABLE BOWEL SYNDROME, UNSPECIFIED TYPE: ICD-10-CM

## 2023-11-22 PROCEDURE — 99204 OFFICE O/P NEW MOD 45 MIN: CPT | Performed by: FAMILY MEDICINE

## 2023-11-22 NOTE — ASSESSMENT & PLAN NOTE
Presently without complaints, this has been a long-term issue from previous provider, no history of irritable bowel, reviewed previous recommendations with patient, obtain updated labs chemistries, referral placed for GI eval ration

## 2023-11-22 NOTE — PROGRESS NOTES
BMI Counseling: Body mass index is 29.44 kg/m². The BMI is above normal. Nutrition recommendations include decreasing portion sizes, decreasing fast food intake, limiting drinks that contain sugar, moderation in carbohydrate intake, increasing intake of lean protein, reducing intake of saturated and trans fat and reducing intake of cholesterol. Exercise recommendations include moderate physical activity 150 minutes/week and exercising 3-5 times per week. No pharmacotherapy was ordered. Rationale for BMI follow-up plan is due to patient being overweight or obese. Depression Screening and Follow-up Plan: Patient was screened for depression during today's encounter. They screened negative with a PHQ-2 score of 0. Assessment/Plan:     Chronic Problems:  Uncomplicated asthma  Stable asymptomatic, continue current care treatments, reviewed step plan of care    Mixed hyperlipidemia  Reviewed history previous treatments, obtain updated lipid profile follow-up discussed    Left lower quadrant abdominal pain  Presently without complaints, this has been a long-term issue from previous provider, no history of irritable bowel, reviewed previous recommendations with patient, obtain updated labs chemistries, referral placed for GI eval ration      Visit Diagnosis:  Diagnoses and all orders for this visit:    Mixed hyperlipidemia  -     Comprehensive metabolic panel; Future  -     CBC and differential; Future  -     Lipid Panel with Direct LDL reflex; Future  -     TSH, 3rd generation; Future  -     UA w Reflex to Microscopic w Reflex to Culture -Lab Collect; Future  -     C-reactive protein; Future    Uncomplicated asthma, unspecified asthma severity, unspecified whether persistent  -     Comprehensive metabolic panel; Future  -     CBC and differential; Future  -     Lipid Panel with Direct LDL reflex; Future  -     TSH, 3rd generation; Future  -     UA w Reflex to Microscopic w Reflex to Culture -Lab Collect;  Future  - C-reactive protein; Future    Left lower quadrant abdominal pain  -     Comprehensive metabolic panel; Future  -     CBC and differential; Future  -     Lipid Panel with Direct LDL reflex; Future  -     TSH, 3rd generation; Future  -     UA w Reflex to Microscopic w Reflex to Culture -Lab Collect; Future  -     C-reactive protein; Future  -     Ambulatory referral to Gastroenterology; Future    Irritable bowel syndrome, unspecified type  -     Comprehensive metabolic panel; Future  -     CBC and differential; Future  -     Lipid Panel with Direct LDL reflex; Future  -     TSH, 3rd generation; Future  -     UA w Reflex to Microscopic w Reflex to Culture -Lab Collect; Future  -     C-reactive protein; Future  -     Ambulatory referral to Gastroenterology; Future          Subjective:    Patient ID: Mariano Arthur is a 46 y.o. male. Establish  Last pcp selam   Last seen June      Today c/o   When last seen June of 2022 , md doss c/o abd pain , was rec to have colonoscopy   Presently with no n/v/d/c/, neg fever chills   Sensation of abd fullness noted more so when lifting objects ,   known to have elevated chol   Cardiology felt related to genetic , med =   Would also like to have labs     Past med   Chol   Asthma controlled with maintenance anuity qd   Anal fissures   Ezcema     Shx   Gallbladder 17 yr   Ankle right 2005     Soc hx     Works [employer] , and [employer]       Abdominal Pain  This is a chronic problem. The current episode started more than 1 year ago. The onset quality is undetermined. The problem occurs intermittently. Pertinent negatives include no arthralgias, constipation, diarrhea, dysuria, fever, frequency, headaches, hematuria, myalgias, nausea or vomiting. Nothing aggravates the pain. The pain is relieved by Nothing. He has tried nothing for the symptoms. His past medical history is significant for irritable bowel syndrome.  There is no history of abdominal surgery, colon cancer, Crohn's disease, gallstones, GERD, pancreatitis, PUD or ulcerative colitis. The following portions of the patient's history were reviewed and updated as appropriate: allergies, current medications, past family history, past medical history, past social history, past surgical history and problem list.    Review of Systems   Constitutional:  Negative for appetite change, chills, fever and unexpected weight change. HENT:  Negative for congestion, dental problem, ear pain, hearing loss, postnasal drip, rhinorrhea, sinus pressure, sinus pain, sneezing, sore throat, tinnitus and voice change. Eyes:  Negative for visual disturbance. Respiratory:  Negative for apnea, cough, chest tightness and shortness of breath. Cardiovascular:  Negative for chest pain, palpitations and leg swelling. Gastrointestinal:  Positive for abdominal pain. Negative for blood in stool, constipation, diarrhea, nausea and vomiting. Endocrine: Negative for cold intolerance, heat intolerance, polydipsia, polyphagia and polyuria. Genitourinary:  Negative for decreased urine volume, difficulty urinating, dysuria, frequency and hematuria. Musculoskeletal:  Negative for arthralgias, back pain, gait problem, joint swelling and myalgias. Skin:  Negative for color change, rash and wound. Allergic/Immunologic: Negative for environmental allergies and food allergies. Neurological:  Negative for dizziness, syncope, weakness, light-headedness, numbness and headaches. Hematological:  Negative for adenopathy. Does not bruise/bleed easily. Psychiatric/Behavioral:  Negative for sleep disturbance and suicidal ideas. The patient is not nervous/anxious.           /80   Pulse 81   Ht 5' 6" (1.676 m)   Wt 82.7 kg (182 lb 6.4 oz)   SpO2 97%   BMI 29.44 kg/m²   Social History     Socioeconomic History    Marital status: /Civil Union     Spouse name: Not on file    Number of children: Not on file Years of education: Not on file    Highest education level: Not on file   Occupational History    Not on file   Tobacco Use    Smoking status: Never     Passive exposure: Past    Smokeless tobacco: Never   Vaping Use    Vaping Use: Never used   Substance and Sexual Activity    Alcohol use: Not Currently    Drug use: Never    Sexual activity: Yes     Partners: Female     Birth control/protection: Male Sterilization   Other Topics Concern    Not on file   Social History Narrative    Not on file     Social Determinants of Health     Financial Resource Strain: Not on file   Food Insecurity: Not on file   Transportation Needs: Not on file   Physical Activity: Not on file   Stress: Not on file   Social Connections: Not on file   Intimate Partner Violence: Not on file   Housing Stability: Not on file     Past Medical History:   Diagnosis Date    Asthma     Erectile dysfunction 2013    Urinary urgency      Family History   Problem Relation Age of Onset    Fibromyalgia Mother     Dementia Father      Past Surgical History:   Procedure Laterality Date    VASECTOMY         Current Outpatient Medications:     Alpha-Lipoic Acid 600 MG TABS, Take by mouth daily, Disp: , Rfl:     Arnuity Ellipta 100 MCG/ACT AEPB inhaler, daily, Disp: , Rfl:     Cholecalciferol 50 MCG (2000 UT) TABS, Take 2,000 Units by mouth daily, Disp: , Rfl:     fenofibrate (TRICOR) 145 mg tablet, daily, Disp: , Rfl:     fluticasone (FLONASE) 50 mcg/act nasal spray, daily, Disp: , Rfl:     olopatadine HCl (PATADAY) 0.2 % opth drops, 1 drop both eyes daily, Disp: , Rfl:     Turmeric Curcumin 500 MG CAPS, Take by mouth daily, Disp: , Rfl:     lidocaine (Lidoderm) 5 %, Apply 1 patch topically in the morning.  Remove & Discard patch within 12 hours or as directed by MD. (Patient not taking: Reported on 9/30/2022), Disp: 15 patch, Rfl: 0    methocarbamol (ROBAXIN) 500 mg tablet, Take 1 tablet (500 mg total) by mouth in the morning and 1 tablet (500 mg total) in the evening. (Patient not taking: Reported on 9/30/2022), Disp: 20 tablet, Rfl: 0    naproxen (Naprosyn) 500 mg tablet, Take 1 tablet (500 mg total) by mouth in the morning and 1 tablet (500 mg total) in the evening. Take with meals. (Patient not taking: Reported on 9/30/2022), Disp: 30 tablet, Rfl: 0    oxybutynin (DITROPAN-XL) 10 MG 24 hr tablet, Take 10 mg by mouth daily at bedtime, Disp: , Rfl:     Allergies   Allergen Reactions    Tilactase Diarrhea    Codeine Abdominal Pain and Myalgia    Latex Swelling    Morphine And Related Abdominal Pain    Wheat Bran - Food Allergy Diarrhea          Lab Review   No visits with results within 6 Month(s) from this visit. Latest known visit with results is:   Procedure visit on 02/22/2023   Component Date Value    LEUKOCYTE ESTERASE,UA 02/22/2023 -     Tura Leaf 02/22/2023 -     SL AMB POCT UROBILINOGEN 02/22/2023 0.2     POCT URINE PROTEIN 02/22/2023 -      Aide Friar 02/22/2023 6.5     BLOOD,UA 02/22/2023 -     SPECIFIC GRAVITY,UA 02/22/2023 1.005     KETONES,UA 02/22/2023 -     BILIRUBIN,UA 02/22/2023 -     GLUCOSE, UA 02/22/2023 -      COLOR,UA 02/22/2023 Light Yellow     CLARITY,UA 02/22/2023 Clear         Imaging: No results found. Objective:     Physical Exam  Constitutional:       General: He is not in acute distress. Appearance: He is well-developed. He is not ill-appearing or toxic-appearing. HENT:      Head: Normocephalic and atraumatic. Neck:      Vascular: No carotid bruit. Cardiovascular:      Rate and Rhythm: Normal rate and regular rhythm. Heart sounds: Normal heart sounds. Pulmonary:      Effort: Pulmonary effort is normal.      Breath sounds: Normal breath sounds. Abdominal:      General: Abdomen is protuberant. Bowel sounds are normal. There is no distension. Palpations: Abdomen is soft. Musculoskeletal:         General: Normal range of motion. Cervical back: Normal range of motion and neck supple.       Right lower leg: No edema. Left lower leg: No edema. Lymphadenopathy:      Cervical: No cervical adenopathy. Skin:     General: Skin is warm and dry. Findings: No lesion or rash. Neurological:      Mental Status: He is alert and oriented to person, place, and time. Deep Tendon Reflexes: Reflexes are normal and symmetric. Psychiatric:         Behavior: Behavior normal.         Thought Content: Thought content normal.         Judgment: Judgment normal.           There are no Patient Instructions on file for this visit. ADRYAN Sinclair    Portions of the record may have been created with voice recognition software. Occasional wrong word or "sound a like" substitutions may have occurred due to the inherent limitations of voice recognition software. Read the chart carefully and recognize, using context, where substitutions have occurred.

## 2023-12-21 ENCOUNTER — APPOINTMENT (OUTPATIENT)
Dept: LAB | Facility: CLINIC | Age: 48
End: 2023-12-21
Payer: COMMERCIAL

## 2023-12-21 DIAGNOSIS — J45.909 UNCOMPLICATED ASTHMA, UNSPECIFIED ASTHMA SEVERITY, UNSPECIFIED WHETHER PERSISTENT: ICD-10-CM

## 2023-12-21 DIAGNOSIS — E78.2 MIXED HYPERLIPIDEMIA: ICD-10-CM

## 2023-12-21 DIAGNOSIS — R10.32 LEFT LOWER QUADRANT ABDOMINAL PAIN: ICD-10-CM

## 2023-12-21 DIAGNOSIS — K58.9 IRRITABLE BOWEL SYNDROME, UNSPECIFIED TYPE: ICD-10-CM

## 2023-12-21 LAB
ALBUMIN SERPL BCP-MCNC: 4.6 G/DL (ref 3.5–5)
ALP SERPL-CCNC: 48 U/L (ref 34–104)
ALT SERPL W P-5'-P-CCNC: 27 U/L (ref 7–52)
ANION GAP SERPL CALCULATED.3IONS-SCNC: 7 MMOL/L
AST SERPL W P-5'-P-CCNC: 22 U/L (ref 13–39)
BASOPHILS # BLD AUTO: 0.05 THOUSANDS/ÂΜL (ref 0–0.1)
BASOPHILS NFR BLD AUTO: 1 % (ref 0–1)
BILIRUB SERPL-MCNC: 0.47 MG/DL (ref 0.2–1)
BILIRUB UR QL STRIP: NEGATIVE
BUN SERPL-MCNC: 23 MG/DL (ref 5–25)
CALCIUM SERPL-MCNC: 9.5 MG/DL (ref 8.4–10.2)
CHLORIDE SERPL-SCNC: 104 MMOL/L (ref 96–108)
CHOLEST SERPL-MCNC: 195 MG/DL
CLARITY UR: CLEAR
CO2 SERPL-SCNC: 30 MMOL/L (ref 21–32)
COLOR UR: NORMAL
CREAT SERPL-MCNC: 1.25 MG/DL (ref 0.6–1.3)
CRP SERPL QL: 12.9 MG/L
EOSINOPHIL # BLD AUTO: 0.15 THOUSAND/ÂΜL (ref 0–0.61)
EOSINOPHIL NFR BLD AUTO: 3 % (ref 0–6)
ERYTHROCYTE [DISTWIDTH] IN BLOOD BY AUTOMATED COUNT: 11.7 % (ref 11.6–15.1)
GFR SERPL CREATININE-BSD FRML MDRD: 66 ML/MIN/1.73SQ M
GLUCOSE P FAST SERPL-MCNC: 115 MG/DL (ref 65–99)
GLUCOSE UR STRIP-MCNC: NEGATIVE MG/DL
HCT VFR BLD AUTO: 47.1 % (ref 36.5–49.3)
HDLC SERPL-MCNC: 32 MG/DL
HGB BLD-MCNC: 16.3 G/DL (ref 12–17)
HGB UR QL STRIP.AUTO: NEGATIVE
IMM GRANULOCYTES # BLD AUTO: 0.01 THOUSAND/UL (ref 0–0.2)
IMM GRANULOCYTES NFR BLD AUTO: 0 % (ref 0–2)
KETONES UR STRIP-MCNC: NEGATIVE MG/DL
LDLC SERPL CALC-MCNC: 104 MG/DL (ref 0–100)
LEUKOCYTE ESTERASE UR QL STRIP: NEGATIVE
LYMPHOCYTES # BLD AUTO: 1.08 THOUSANDS/ÂΜL (ref 0.6–4.47)
LYMPHOCYTES NFR BLD AUTO: 19 % (ref 14–44)
MCH RBC QN AUTO: 32.1 PG (ref 26.8–34.3)
MCHC RBC AUTO-ENTMCNC: 34.6 G/DL (ref 31.4–37.4)
MCV RBC AUTO: 93 FL (ref 82–98)
MONOCYTES # BLD AUTO: 0.46 THOUSAND/ÂΜL (ref 0.17–1.22)
MONOCYTES NFR BLD AUTO: 8 % (ref 4–12)
NEUTROPHILS # BLD AUTO: 3.95 THOUSANDS/ÂΜL (ref 1.85–7.62)
NEUTS SEG NFR BLD AUTO: 69 % (ref 43–75)
NITRITE UR QL STRIP: NEGATIVE
NRBC BLD AUTO-RTO: 0 /100 WBCS
PH UR STRIP.AUTO: 6 [PH]
PLATELET # BLD AUTO: 315 THOUSANDS/UL (ref 149–390)
PMV BLD AUTO: 9.2 FL (ref 8.9–12.7)
POTASSIUM SERPL-SCNC: 4.5 MMOL/L (ref 3.5–5.3)
PROT SERPL-MCNC: 6.9 G/DL (ref 6.4–8.4)
PROT UR STRIP-MCNC: NEGATIVE MG/DL
RBC # BLD AUTO: 5.07 MILLION/UL (ref 3.88–5.62)
SODIUM SERPL-SCNC: 141 MMOL/L (ref 135–147)
SP GR UR STRIP.AUTO: 1.01 (ref 1–1.03)
TRIGL SERPL-MCNC: 294 MG/DL
TSH SERPL DL<=0.05 MIU/L-ACNC: 1.8 UIU/ML (ref 0.45–4.5)
UROBILINOGEN UR STRIP-ACNC: <2 MG/DL
WBC # BLD AUTO: 5.7 THOUSAND/UL (ref 4.31–10.16)

## 2023-12-21 PROCEDURE — 80053 COMPREHEN METABOLIC PANEL: CPT

## 2023-12-21 PROCEDURE — 85025 COMPLETE CBC W/AUTO DIFF WBC: CPT

## 2023-12-21 PROCEDURE — 36415 COLL VENOUS BLD VENIPUNCTURE: CPT

## 2023-12-21 PROCEDURE — 80061 LIPID PANEL: CPT

## 2023-12-21 PROCEDURE — 81003 URINALYSIS AUTO W/O SCOPE: CPT

## 2023-12-21 PROCEDURE — 84443 ASSAY THYROID STIM HORMONE: CPT

## 2023-12-21 PROCEDURE — 86140 C-REACTIVE PROTEIN: CPT

## 2023-12-27 ENCOUNTER — OFFICE VISIT (OUTPATIENT)
Dept: FAMILY MEDICINE CLINIC | Facility: CLINIC | Age: 48
End: 2023-12-27
Payer: COMMERCIAL

## 2023-12-27 VITALS
HEIGHT: 66 IN | SYSTOLIC BLOOD PRESSURE: 138 MMHG | WEIGHT: 183 LBS | HEART RATE: 89 BPM | BODY MASS INDEX: 29.41 KG/M2 | DIASTOLIC BLOOD PRESSURE: 84 MMHG | OXYGEN SATURATION: 97 %

## 2023-12-27 DIAGNOSIS — Z00.00 ANNUAL PHYSICAL EXAM: Primary | ICD-10-CM

## 2023-12-27 DIAGNOSIS — E78.2 MIXED HYPERLIPIDEMIA: ICD-10-CM

## 2023-12-27 PROCEDURE — 99396 PREV VISIT EST AGE 40-64: CPT | Performed by: FAMILY MEDICINE

## 2023-12-27 RX ORDER — BIOTIN 5 MG
TABLET ORAL
COMMUNITY
End: 2023-12-27 | Stop reason: ALTCHOICE

## 2023-12-27 RX ORDER — CALCIUM CARBONATE/VITAMIN D3 500-10/5ML
LIQUID (ML) ORAL
COMMUNITY

## 2023-12-27 RX ORDER — LORATADINE 10 MG/1
10 TABLET ORAL DAILY
COMMUNITY

## 2023-12-27 RX ORDER — OMEGA-3-ACID ETHYL ESTERS 1 G/1
2 CAPSULE, LIQUID FILLED ORAL 2 TIMES DAILY
Qty: 180 CAPSULE | Refills: 5 | Status: SHIPPED | OUTPATIENT
Start: 2023-12-27 | End: 2024-01-04 | Stop reason: SDUPTHER

## 2023-12-27 RX ORDER — FOLIC ACID 0.8 MG
TABLET ORAL
COMMUNITY

## 2023-12-27 NOTE — PROGRESS NOTES
ADULT ANNUAL PHYSICAL  [facility] [address] [facility]    NAME: Chaim Bunn  AGE: 48 y.o. SEX: male  : 1975     DATE: 2023     Assessment and Plan:     Problem List Items Addressed This Visit    None      Immunizations and preventive care screenings were discussed with patient today. Appropriate education was printed on patient's after visit summary.    Discussed risks and benefits of prostate cancer screening. We discussed the controversial history of PSA screening for prostate cancer in the [place] as well as the risk of over detection and over treatment of prostate cancer by way of PSA screening.  The patient understands that PSA blood testing is an imperfect way to screen for prostate cancer and that elevated PSA levels in the blood may also be caused by infection, inflammation, prostatic trauma or manipulation, urological procedures, or by benign prostatic enlargement.    The role of the digital rectal examination in prostate cancer screening was also discussed and I discussed with him that there is large interobserver variability in the findings of digital rectal examination.    Counseling:  Alcohol/drug use: discussed moderation in alcohol intake, the recommendations for healthy alcohol use, and avoidance of illicit drug use.  Dental Health: discussed importance of regular tooth brushing, flossing, and dental visits.  Injury prevention: discussed safety/seat belts, safety helmets, smoke detectors, carbon dioxide detectors, and smoking near bedding or upholstery.  Exercise: the importance of regular exercise/physical activity was discussed. Recommend exercise 3-5 times per week for at least 30 minutes.          No follow-ups on file.     Chief Complaint:     Chief Complaint   Patient presents with    Physical Exam      History of Present Illness:     Adult Annual Physical   Patient here for a comprehensive physical exam.  The patient reports no problems.    Diet and Physical Activity  Diet/Nutrition: well balanced diet.   Exercise: no formal exercise.      Depression Screening  PHQ-2/9 Depression Screening           General Health  Sleep: sleeps well.   Hearing: normal - none .  Vision: most recent eye exam >1 year ago and wears glasses.   Dental: regular dental visits.        Health  Symptoms include: none       Review of Systems:     Review of Systems   Constitutional:  Negative for appetite change, chills, fever and unexpected weight change.   HENT:  Negative for congestion, dental problem, ear pain, hearing loss, postnasal drip, rhinorrhea, sinus pressure, sinus pain, sneezing, sore throat, tinnitus and voice change.    Eyes:  Negative for visual disturbance.   Respiratory:  Negative for apnea, cough, chest tightness and shortness of breath.    Cardiovascular:  Negative for chest pain, palpitations and leg swelling.   Gastrointestinal:  Negative for abdominal pain, blood in stool, constipation, diarrhea, nausea and vomiting.   Endocrine: Negative for cold intolerance, heat intolerance, polydipsia, polyphagia and polyuria.   Genitourinary:  Negative for decreased urine volume, difficulty urinating, dysuria, frequency and hematuria.   Musculoskeletal:  Negative for arthralgias, back pain, gait problem, joint swelling and myalgias.   Skin:  Negative for color change, rash and wound.   Allergic/Immunologic: Negative for environmental allergies and food allergies.   Neurological:  Negative for dizziness, syncope, weakness, light-headedness, numbness and headaches.   Hematological:  Negative for adenopathy. Does not bruise/bleed easily.   Psychiatric/Behavioral:  Negative for sleep disturbance and suicidal ideas. The patient is not nervous/anxious.       Past Medical History:     Past Medical History:   Diagnosis Date    Asthma     Erectile dysfunction 2013    Urinary urgency       Past Surgical History:     Past Surgical History:    Procedure Laterality Date    VASECTOMY        Family History:     Family History   Problem Relation Age of Onset    Fibromyalgia Mother     Dementia Father       Social History:     Social History     Socioeconomic History    Marital status: /Civil Union     Spouse name: None    Number of children: None    Years of education: None    Highest education level: None   Occupational History    None   Tobacco Use    Smoking status: Never     Passive exposure: Past    Smokeless tobacco: Never   Vaping Use    Vaping status: Never Used   Substance and Sexual Activity    Alcohol use: Not Currently    Drug use: Never    Sexual activity: Yes     Partners: Female     Birth control/protection: Male Sterilization   Other Topics Concern    None   Social History Narrative    None     Social Determinants of Health     Financial Resource Strain: Not on file   Food Insecurity: No Food Insecurity (8/1/2019)    Received from [facility]    Hunger Vital Sign     Worried About Running Out of Food in the Last Year: Never true     Ran Out of Food in the Last Year: Never true   Transportation Needs: Not on file   Physical Activity: Not on file   Stress: Not on file   Social Connections: Not on file   Intimate Partner Violence: Not on file   Housing Stability: Not on file      Current Medications:     Current Outpatient Medications   Medication Sig Dispense Refill    Alpha-Lipoic Acid 600 MG TABS Take by mouth daily      Arnuity Ellipta 100 MCG/ACT AEPB inhaler daily      Cholecalciferol 50 MCG (2000 UT) TABS Take 2,000 Units by mouth daily      fenofibrate (TRICOR) 145 mg tablet daily      fluticasone (FLONASE) 50 mcg/act nasal spray daily      Krill Oil 1000 MG CAPS Take by mouth Two po qd      loratadine (CLARITIN) 10 mg tablet Take 10 mg by mouth daily      Magnesium 500 MG CAPS Take by mouth Every other week      olopatadine HCl (PATADAY) 0.2 % opth drops 1 drop both eyes daily      Turmeric Curcumin 500 MG CAPS Take by mouth daily  "     Zinc 30 MG CAPS Take by mouth Every other week      oxybutynin (DITROPAN-XL) 10 MG 24 hr tablet Take 10 mg by mouth daily at bedtime       No current facility-administered medications for this visit.      Allergies:     Allergies   Allergen Reactions    Tilactase Diarrhea    Codeine Abdominal Pain and Myalgia    Latex Swelling    Morphine And Related Abdominal Pain    Wheat Bran - Food Allergy Diarrhea      Physical Exam:     /84   Pulse 89   Ht 5' 6\" (1.676 m)   Wt 83 kg (183 lb)   SpO2 97%   BMI 29.54 kg/m²     Physical Exam  Vitals and nursing note reviewed.   Constitutional:       General: He is not in acute distress.     Appearance: He is well-developed. He is not ill-appearing or toxic-appearing.   HENT:      Head: Normocephalic and atraumatic.      Right Ear: Tympanic membrane normal.      Left Ear: Tympanic membrane normal.   Eyes:      Conjunctiva/sclera: Conjunctivae normal.   Cardiovascular:      Rate and Rhythm: Normal rate and regular rhythm.      Heart sounds: No murmur heard.  Pulmonary:      Effort: Pulmonary effort is normal. No respiratory distress.      Breath sounds: Normal breath sounds.   Abdominal:      Palpations: Abdomen is soft.      Tenderness: There is no abdominal tenderness.   Musculoskeletal:         General: No swelling.      Cervical back: Neck supple.   Skin:     General: Skin is warm and dry.      Capillary Refill: Capillary refill takes less than 2 seconds.   Neurological:      Mental Status: He is alert.   Psychiatric:         Mood and Affect: Mood normal.          ADRYAN Ashley  [facility] [address] [facility]    "

## 2024-01-02 ENCOUNTER — OFFICE VISIT (OUTPATIENT)
Dept: GASTROENTEROLOGY | Facility: CLINIC | Age: 49
End: 2024-01-02
Payer: COMMERCIAL

## 2024-01-02 VITALS
HEART RATE: 90 BPM | WEIGHT: 181.4 LBS | DIASTOLIC BLOOD PRESSURE: 90 MMHG | BODY MASS INDEX: 29.15 KG/M2 | OXYGEN SATURATION: 96 % | HEIGHT: 66 IN | SYSTOLIC BLOOD PRESSURE: 150 MMHG

## 2024-01-02 DIAGNOSIS — R10.13 EPIGASTRIC PAIN: Primary | ICD-10-CM

## 2024-01-02 DIAGNOSIS — R10.32 LEFT LOWER QUADRANT ABDOMINAL PAIN: ICD-10-CM

## 2024-01-02 DIAGNOSIS — K58.9 IRRITABLE BOWEL SYNDROME, UNSPECIFIED TYPE: ICD-10-CM

## 2024-01-02 DIAGNOSIS — Z87.19 HISTORY OF GASTRITIS: ICD-10-CM

## 2024-01-02 PROCEDURE — 99203 OFFICE O/P NEW LOW 30 MIN: CPT | Performed by: PHYSICIAN ASSISTANT

## 2024-01-02 RX ORDER — HYDROCORTISONE ACETATE PRAMOXINE HCL 2.5; 1 G/100G; G/100G
CREAM TOPICAL 3 TIMES DAILY
Qty: 30 G | Refills: 2 | Status: SHIPPED | OUTPATIENT
Start: 2024-01-02 | End: 2024-02-01

## 2024-01-02 RX ORDER — DICYCLOMINE HCL 20 MG
20 TABLET ORAL EVERY 6 HOURS
Qty: 90 TABLET | Refills: 0 | Status: SHIPPED | OUTPATIENT
Start: 2024-01-02

## 2024-01-02 NOTE — LETTER
January 4, 2024     ADRYAN Ashley  [address] [address]    Patient: Chaim Bunn   YOB: 1975   Date of Visit: 1/2/2024       Dear Dr. Etienne:    Thank you for referring Chaim Bunn to me for evaluation. Below are my notes for this consultation.    If you have questions, please do not hesitate to call me. I look forward to following your patient along with you.         Sincerely,        Paz Pineda PA-C        CC: No Recipients    Paz Pineda PA-C  1/2/2024  4:00 PM  Signed  Answers submitted by the patient for this visit:  Abdominal Pain Questionnaire (Submitted on 12/26/2023)  Chief Complaint: Abdominal pain  Chronicity: recurrent  Onset: today  Onset quality: gradual  Frequency: constantly  Progression since onset: waxing and waning  Pain location: LLQ, LUQ, epigastric region, periumbilical region  Pain - numeric: 7/10  Pain quality: a sensation of fullness, sharp  Radiates to: LLQ, LUQ, epigastric region, periumbilical region, suprapubic region, back, left flank, pelvis, scrotum  anorexia: No  arthralgias: No  belching: No  constipation: Yes  diarrhea: Yes  dysuria: No  fever: No  flatus: Yes  frequency: Yes  headaches: No  hematochezia: Yes  hematuria: No  melena: No  myalgias: No  nausea: No  weight loss: No  vomiting: No  Aggravated by: bowel movement, coughing, deep breathing, eating, movement, palpation  [facility] - Outpatient Consultation  Chaim Bunn 48 y.o. male MRN: [id number]  Encounter: [id number]          ASSESSMENT AND PLAN:      1. Left lower quadrant abdominal pain  2. Irritable bowel syndrome, unspecified type  3. Epigastric pain  4. History of gastritis  -Patient will be scheduled for an EGD and colonoscopy with biopsy.    -High-fiber diet recommended.  Encourage plenty of water intake.    -Patient can continue utilize Dulcolax and/or MiraLAX for now.    -Will begin dicyclomine 20 mg every 6  hours as needed pain.    -Patient will follow-up after GI procedures are complete.  ______________________________________________________________________    HPI:    48-year-old male with a past medical history significant for irritable bowel syndrome, history of gastritis, hyperlipidemia, and asthma who presents to the GI clinic today for consultation.  Patient reports that he has a longstanding history of abdominal issues.  Patient was diagnosed with irritable bowel syndrome in the past.  He reports that about a year ago he was suffering with left lower quadrant abdominal pain and he was supposed to have a colonoscopy but unfortunately he had some urology issues come up that he took care of first.  He reports that he is still having episodes of left lower quadrant abdominal pain.  He reports change in bowels with fragmented stools.  He reports epigastric abdominal pain.  He reports that he was on a regimen of hemp and Perez seeds for quite some time.  He reports that he was doing this consistently and it did seem to help but he did stop this about a year and a half ago.  He is not on a specific probiotic.  He does take Duca locks and/or MiraLAX to help with his bowel movements.  He denies any dysphagia, hematemesis, rectal bleeding, melena.    REVIEW OF SYSTEMS:    CONSTITUTIONAL: Denies any fever, chills, rigors, and weight loss.  HEENT: No earache or tinnitus. Denies hearing loss or visual disturbances.  CARDIOVASCULAR: No chest pain or palpitations.   RESPIRATORY: Denies any cough, hemoptysis, shortness of breath or dyspnea on exertion.  GASTROINTESTINAL: As noted in the History of Present Illness.   GENITOURINARY: No problems with urination. Denies any hematuria or dysuria.  NEUROLOGIC: No dizziness or vertigo, denies headaches.   MUSCULOSKELETAL: Denies any muscle or joint pain.   SKIN: Denies skin rashes or itching.   ENDOCRINE: Denies excessive thirst. Denies intolerance to heat or cold.  PSYCHOSOCIAL:  "Denies depression or anxiety. Denies any recent memory loss.       Historical Information  Past Medical History:   Diagnosis Date   • Asthma    • Erectile dysfunction 2013   • Urinary urgency      Past Surgical History:   Procedure Laterality Date   • VASECTOMY       Social History  Social History     Substance and Sexual Activity   Alcohol Use Not Currently     Social History     Substance and Sexual Activity   Drug Use Never     Social History     Tobacco Use   Smoking Status Never   • Passive exposure: Past   Smokeless Tobacco Never     Family History   Problem Relation Age of Onset   • Fibromyalgia Mother    • Dementia Father        Meds/Allergies      Current Outpatient Medications:   •  Alpha-Lipoic Acid 600 MG TABS  •  Arnuity Ellipta 100 MCG/ACT AEPB inhaler  •  Cholecalciferol 50 MCG (2000 UT) TABS  •  dicyclomine (BENTYL) 20 mg tablet  •  fenofibrate (TRICOR) 145 mg tablet  •  fluticasone (FLONASE) 50 mcg/act nasal spray  •  hydrocortisone-pramoxine (ANALPRAM-HC) 2.5-1 % rectal cream  •  loratadine (CLARITIN) 10 mg tablet  •  Magnesium 500 MG CAPS  •  olopatadine HCl (PATADAY) 0.2 % opth drops  •  omega-3-acid ethyl esters (LOVAZA) 1 g capsule  •  Turmeric Curcumin 500 MG CAPS  •  Zinc 30 MG CAPS    Allergies   Allergen Reactions   • Tilactase Diarrhea   • Codeine Abdominal Pain and Myalgia   • Latex Swelling   • Morphine And Related Abdominal Pain   • Wheat Bran - Food Allergy Diarrhea           Objective    Blood pressure 150/90, pulse 90, height 5' 6\" (1.676 m), weight 82.3 kg (181 lb 6.4 oz), SpO2 96%. Body mass index is 29.28 kg/m².        PHYSICAL EXAM:      General Appearance:   Alert, cooperative, no distress   HEENT:   Normocephalic, atraumatic, anicteric.     Neck:  Supple, symmetrical, trachea midline   Lungs:   Clear to auscultation bilaterally; no rales, rhonchi or wheezing; respirations unlabored    Heart::   Regular rate and rhythm; no murmur, rub, or gallop.   Abdomen:   Soft, non-tender, " non-distended; normal bowel sounds; no masses, no organomegaly    Genitalia:   Deferred    Rectal:   Deferred    Extremities:  No cyanosis, clubbing or edema    Pulses:  2+ and symmetric    Skin:  No jaundice, rashes, or lesions    Lymph nodes:  No palpable cervical lymphadenopathy        Lab Results:   No visits with results within 1 Day(s) from this visit.   Latest known visit with results is:   Appointment on 12/21/2023   Component Date Value   • Sodium 12/21/2023 141    • Potassium 12/21/2023 4.5    • Chloride 12/21/2023 104    • CO2 12/21/2023 30    • ANION GAP 12/21/2023 7    • BUN 12/21/2023 23    • Creatinine 12/21/2023 1.25    • Glucose, Fasting 12/21/2023 115 (H)    • Calcium 12/21/2023 9.5    • AST 12/21/2023 22    • ALT 12/21/2023 27    • Alkaline Phosphatase 12/21/2023 48    • Total Protein 12/21/2023 6.9    • Albumin 12/21/2023 4.6    • Total Bilirubin 12/21/2023 0.47    • eGFR 12/21/2023 66    • WBC 12/21/2023 5.70    • RBC 12/21/2023 5.07    • Hemoglobin 12/21/2023 16.3    • Hematocrit 12/21/2023 47.1    • MCV 12/21/2023 93    • MCH 12/21/2023 32.1    • MCHC 12/21/2023 34.6    • RDW 12/21/2023 11.7    • MPV 12/21/2023 9.2    • Platelets 12/21/2023 315    • nRBC 12/21/2023 0    • Neutrophils Relative 12/21/2023 69    • Immat GRANS % 12/21/2023 0    • Lymphocytes Relative 12/21/2023 19    • Monocytes Relative 12/21/2023 8    • Eosinophils Relative 12/21/2023 3    • Basophils Relative 12/21/2023 1    • Neutrophils Absolute 12/21/2023 3.95    • Immature Grans Absolute 12/21/2023 0.01    • Lymphocytes Absolute 12/21/2023 1.08    • Monocytes Absolute 12/21/2023 0.46    • Eosinophils Absolute 12/21/2023 0.15    • Basophils Absolute 12/21/2023 0.05    • Cholesterol 12/21/2023 195    • Triglycerides 12/21/2023 294 (H)    • HDL, Direct 12/21/2023 32 (L)    • LDL Calculated 12/21/2023 104 (H)    • TSH 3RD GENERATON 12/21/2023 1.798    • Color, UA 12/21/2023 Light Yellow    • Clarity, UA 12/21/2023 Clear    •  Specific [place], UA 12/21/2023 1.012    • pH, UA 12/21/2023 6.0    • Leukocytes, UA 12/21/2023 Negative    • Nitrite, UA 12/21/2023 Negative    • Protein, UA 12/21/2023 Negative    • Glucose, UA 12/21/2023 Negative    • Ketones, UA 12/21/2023 Negative    • Urobilinogen, UA 12/21/2023 <2.0    • Bilirubin, UA 12/21/2023 Negative    • Occult Blood, UA 12/21/2023 Negative    • CRP 12/21/2023 12.9 (H)          Radiology Results:   No results found.

## 2024-01-02 NOTE — PROGRESS NOTES
Answers submitted by the patient for this visit:  Abdominal Pain Questionnaire (Submitted on 12/26/2023)  Chief Complaint: Abdominal pain  Chronicity: recurrent  Onset: today  Onset quality: gradual  Frequency: constantly  Progression since onset: waxing and waning  Pain location: LLQ, LUQ, epigastric region, periumbilical region  Pain - numeric: 7/10  Pain quality: a sensation of fullness, sharp  Radiates to: LLQ, LUQ, epigastric region, periumbilical region, suprapubic region, back, left flank, pelvis, scrotum  anorexia: No  arthralgias: No  belching: No  constipation: Yes  diarrhea: Yes  dysuria: No  fever: No  flatus: Yes  frequency: Yes  headaches: No  hematochezia: Yes  hematuria: No  melena: No  myalgias: No  nausea: No  weight loss: No  vomiting: No  Aggravated by: bowel movement, coughing, deep breathing, eating, movement, palpation  [facility] - Outpatient Consultation  Chaim Bunn 48 y.o. male MRN: [id number]  Encounter: [id number]          ASSESSMENT AND PLAN:      1. Left lower quadrant abdominal pain  2. Irritable bowel syndrome, unspecified type  3. Epigastric pain  4. History of gastritis  -Patient will be scheduled for an EGD and colonoscopy with biopsy.    -High-fiber diet recommended.  Encourage plenty of water intake.    -Patient can continue utilize Dulcolax and/or MiraLAX for now.    -Will begin dicyclomine 20 mg every 6 hours as needed pain.    -Patient will follow-up after GI procedures are complete.  ______________________________________________________________________    HPI:    48-year-old male with a past medical history significant for irritable bowel syndrome, history of gastritis, hyperlipidemia, and asthma who presents to the GI clinic today for consultation.  Patient reports that he has a longstanding history of abdominal issues.  Patient was diagnosed with irritable bowel syndrome in the past.  He reports that about a year ago he was suffering  with left lower quadrant abdominal pain and he was supposed to have a colonoscopy but unfortunately he had some urology issues come up that he took care of first.  He reports that he is still having episodes of left lower quadrant abdominal pain.  He reports change in bowels with fragmented stools.  He reports epigastric abdominal pain.  He reports that he was on a regimen of hemp and Perez seeds for quite some time.  He reports that he was doing this consistently and it did seem to help but he did stop this about a year and a half ago.  He is not on a specific probiotic.  He does take Duca locks and/or MiraLAX to help with his bowel movements.  He denies any dysphagia, hematemesis, rectal bleeding, melena.    REVIEW OF SYSTEMS:    CONSTITUTIONAL: Denies any fever, chills, rigors, and weight loss.  HEENT: No earache or tinnitus. Denies hearing loss or visual disturbances.  CARDIOVASCULAR: No chest pain or palpitations.   RESPIRATORY: Denies any cough, hemoptysis, shortness of breath or dyspnea on exertion.  GASTROINTESTINAL: As noted in the History of Present Illness.   GENITOURINARY: No problems with urination. Denies any hematuria or dysuria.  NEUROLOGIC: No dizziness or vertigo, denies headaches.   MUSCULOSKELETAL: Denies any muscle or joint pain.   SKIN: Denies skin rashes or itching.   ENDOCRINE: Denies excessive thirst. Denies intolerance to heat or cold.  PSYCHOSOCIAL: Denies depression or anxiety. Denies any recent memory loss.       Historical Information   Past Medical History:   Diagnosis Date    Asthma     Erectile dysfunction 2013    Urinary urgency      Past Surgical History:   Procedure Laterality Date    VASECTOMY       Social History   Social History     Substance and Sexual Activity   Alcohol Use Not Currently     Social History     Substance and Sexual Activity   Drug Use Never     Social History     Tobacco Use   Smoking Status Never    Passive exposure: Past   Smokeless Tobacco Never     Family  "History   Problem Relation Age of Onset    Fibromyalgia Mother     Dementia Father        Meds/Allergies       Current Outpatient Medications:     Alpha-Lipoic Acid 600 MG TABS    Arnuity Ellipta 100 MCG/ACT AEPB inhaler    Cholecalciferol 50 MCG (2000 UT) TABS    dicyclomine (BENTYL) 20 mg tablet    fenofibrate (TRICOR) 145 mg tablet    fluticasone (FLONASE) 50 mcg/act nasal spray    hydrocortisone-pramoxine (ANALPRAM-HC) 2.5-1 % rectal cream    loratadine (CLARITIN) 10 mg tablet    Magnesium 500 MG CAPS    olopatadine HCl (PATADAY) 0.2 % opth drops    omega-3-acid ethyl esters (LOVAZA) 1 g capsule    Turmeric Curcumin 500 MG CAPS    Zinc 30 MG CAPS    Allergies   Allergen Reactions    Tilactase Diarrhea    Codeine Abdominal Pain and Myalgia    Latex Swelling    Morphine And Related Abdominal Pain    Wheat Bran - Food Allergy Diarrhea           Objective     Blood pressure 150/90, pulse 90, height 5' 6\" (1.676 m), weight 82.3 kg (181 lb 6.4 oz), SpO2 96%. Body mass index is 29.28 kg/m².        PHYSICAL EXAM:      General Appearance:   Alert, cooperative, no distress   HEENT:   Normocephalic, atraumatic, anicteric.     Neck:  Supple, symmetrical, trachea midline   Lungs:   Clear to auscultation bilaterally; no rales, rhonchi or wheezing; respirations unlabored    Heart::   Regular rate and rhythm; no murmur, rub, or gallop.   Abdomen:   Soft, non-tender, non-distended; normal bowel sounds; no masses, no organomegaly    Genitalia:   Deferred    Rectal:   Deferred    Extremities:  No cyanosis, clubbing or edema    Pulses:  2+ and symmetric    Skin:  No jaundice, rashes, or lesions    Lymph nodes:  No palpable cervical lymphadenopathy        Lab Results:   No visits with results within 1 Day(s) from this visit.   Latest known visit with results is:   Appointment on 12/21/2023   Component Date Value    Sodium 12/21/2023 141     Potassium 12/21/2023 4.5     Chloride 12/21/2023 104     CO2 12/21/2023 30     ANION GAP " 12/21/2023 7     BUN 12/21/2023 23     Creatinine 12/21/2023 1.25     Glucose, Fasting 12/21/2023 115 (H)     Calcium 12/21/2023 9.5     AST 12/21/2023 22     ALT 12/21/2023 27     Alkaline Phosphatase 12/21/2023 48     Total Protein 12/21/2023 6.9     Albumin 12/21/2023 4.6     Total Bilirubin 12/21/2023 0.47     eGFR 12/21/2023 66     WBC 12/21/2023 5.70     RBC 12/21/2023 5.07     Hemoglobin 12/21/2023 16.3     Hematocrit 12/21/2023 47.1     MCV 12/21/2023 93     MCH 12/21/2023 32.1     MCHC 12/21/2023 34.6     RDW 12/21/2023 11.7     MPV 12/21/2023 9.2     Platelets 12/21/2023 315     nRBC 12/21/2023 0     Neutrophils Relative 12/21/2023 69     Immat GRANS % 12/21/2023 0     Lymphocytes Relative 12/21/2023 19     Monocytes Relative 12/21/2023 8     Eosinophils Relative 12/21/2023 3     Basophils Relative 12/21/2023 1     Neutrophils Absolute 12/21/2023 3.95     Immature Grans Absolute 12/21/2023 0.01     Lymphocytes Absolute 12/21/2023 1.08     Monocytes Absolute 12/21/2023 0.46     Eosinophils Absolute 12/21/2023 0.15     Basophils Absolute 12/21/2023 0.05     Cholesterol 12/21/2023 195     Triglycerides 12/21/2023 294 (H)     HDL, Direct 12/21/2023 32 (L)     LDL Calculated 12/21/2023 104 (H)     TSH 3RD GENERATON 12/21/2023 1.798     Color, UA 12/21/2023 Light Yellow     Clarity, UA 12/21/2023 Clear     Specific Gravity, UA 12/21/2023 1.012     pH, UA 12/21/2023 6.0     Leukocytes, UA 12/21/2023 Negative     Nitrite, UA 12/21/2023 Negative     Protein, UA 12/21/2023 Negative     Glucose, UA 12/21/2023 Negative     Ketones, UA 12/21/2023 Negative     Urobilinogen, UA 12/21/2023 <2.0     Bilirubin, UA 12/21/2023 Negative     Occult Blood, UA 12/21/2023 Negative     CRP 12/21/2023 12.9 (H)          Radiology Results:   No results found.

## 2024-01-02 NOTE — PATIENT INSTRUCTIONS
Scheduled date of EGD/colonoscopy (as of today):1/19/24  Physician performing EGD/colonoscopy:Riri  Location of EGD/colonoscopy:[facility]  Desired bowel prep reviewed with patient:Eloy/Miralax  Instructions reviewed with patient by:Justin humphreys  Clearances: none

## 2024-01-02 NOTE — H&P (VIEW-ONLY)
Answers submitted by the patient for this visit:  Abdominal Pain Questionnaire (Submitted on 12/26/2023)  Chief Complaint: Abdominal pain  Chronicity: recurrent  Onset: today  Onset quality: gradual  Frequency: constantly  Progression since onset: waxing and waning  Pain location: LLQ, LUQ, epigastric region, periumbilical region  Pain - numeric: 7/10  Pain quality: a sensation of fullness, sharp  Radiates to: LLQ, LUQ, epigastric region, periumbilical region, suprapubic region, back, left flank, pelvis, scrotum  anorexia: No  arthralgias: No  belching: No  constipation: Yes  diarrhea: Yes  dysuria: No  fever: No  flatus: Yes  frequency: Yes  headaches: No  hematochezia: Yes  hematuria: No  melena: No  myalgias: No  nausea: No  weight loss: No  vomiting: No  Aggravated by: bowel movement, coughing, deep breathing, eating, movement, palpation  [facility] - Outpatient Consultation  Chaim Bunn 48 y.o. male MRN: [id number]  Encounter: [id number]          ASSESSMENT AND PLAN:      1. Left lower quadrant abdominal pain  2. Irritable bowel syndrome, unspecified type  3. Epigastric pain  4. History of gastritis  -Patient will be scheduled for an EGD and colonoscopy with biopsy.    -High-fiber diet recommended.  Encourage plenty of water intake.    -Patient can continue utilize Dulcolax and/or MiraLAX for now.    -Will begin dicyclomine 20 mg every 6 hours as needed pain.    -Patient will follow-up after GI procedures are complete.  ______________________________________________________________________    HPI:    48-year-old male with a past medical history significant for irritable bowel syndrome, history of gastritis, hyperlipidemia, and asthma who presents to the GI clinic today for consultation.  Patient reports that he has a longstanding history of abdominal issues.  Patient was diagnosed with irritable bowel syndrome in the past.  He reports that about a year ago he was suffering  with left lower quadrant abdominal pain and he was supposed to have a colonoscopy but unfortunately he had some urology issues come up that he took care of first.  He reports that he is still having episodes of left lower quadrant abdominal pain.  He reports change in bowels with fragmented stools.  He reports epigastric abdominal pain.  He reports that he was on a regimen of hemp and Perez seeds for quite some time.  He reports that he was doing this consistently and it did seem to help but he did stop this about a year and a half ago.  He is not on a specific probiotic.  He does take Duca locks and/or MiraLAX to help with his bowel movements.  He denies any dysphagia, hematemesis, rectal bleeding, melena.    REVIEW OF SYSTEMS:    CONSTITUTIONAL: Denies any fever, chills, rigors, and weight loss.  HEENT: No earache or tinnitus. Denies hearing loss or visual disturbances.  CARDIOVASCULAR: No chest pain or palpitations.   RESPIRATORY: Denies any cough, hemoptysis, shortness of breath or dyspnea on exertion.  GASTROINTESTINAL: As noted in the History of Present Illness.   GENITOURINARY: No problems with urination. Denies any hematuria or dysuria.  NEUROLOGIC: No dizziness or vertigo, denies headaches.   MUSCULOSKELETAL: Denies any muscle or joint pain.   SKIN: Denies skin rashes or itching.   ENDOCRINE: Denies excessive thirst. Denies intolerance to heat or cold.  PSYCHOSOCIAL: Denies depression or anxiety. Denies any recent memory loss.       Historical Information   Past Medical History:   Diagnosis Date    Asthma     Erectile dysfunction 2013    Urinary urgency      Past Surgical History:   Procedure Laterality Date    VASECTOMY       Social History   Social History     Substance and Sexual Activity   Alcohol Use Not Currently     Social History     Substance and Sexual Activity   Drug Use Never     Social History     Tobacco Use   Smoking Status Never    Passive exposure: Past   Smokeless Tobacco Never     Family  "History   Problem Relation Age of Onset    Fibromyalgia Mother     Dementia Father        Meds/Allergies       Current Outpatient Medications:     Alpha-Lipoic Acid 600 MG TABS    Arnuity Ellipta 100 MCG/ACT AEPB inhaler    Cholecalciferol 50 MCG (2000 UT) TABS    dicyclomine (BENTYL) 20 mg tablet    fenofibrate (TRICOR) 145 mg tablet    fluticasone (FLONASE) 50 mcg/act nasal spray    hydrocortisone-pramoxine (ANALPRAM-HC) 2.5-1 % rectal cream    loratadine (CLARITIN) 10 mg tablet    Magnesium 500 MG CAPS    olopatadine HCl (PATADAY) 0.2 % opth drops    omega-3-acid ethyl esters (LOVAZA) 1 g capsule    Turmeric Curcumin 500 MG CAPS    Zinc 30 MG CAPS    Allergies   Allergen Reactions    Tilactase Diarrhea    Codeine Abdominal Pain and Myalgia    Latex Swelling    Morphine And Related Abdominal Pain    Wheat Bran - Food Allergy Diarrhea           Objective     Blood pressure 150/90, pulse 90, height 5' 6\" (1.676 m), weight 82.3 kg (181 lb 6.4 oz), SpO2 96%. Body mass index is 29.28 kg/m².        PHYSICAL EXAM:      General Appearance:   Alert, cooperative, no distress   HEENT:   Normocephalic, atraumatic, anicteric.     Neck:  Supple, symmetrical, trachea midline   Lungs:   Clear to auscultation bilaterally; no rales, rhonchi or wheezing; respirations unlabored    Heart::   Regular rate and rhythm; no murmur, rub, or gallop.   Abdomen:   Soft, non-tender, non-distended; normal bowel sounds; no masses, no organomegaly    Genitalia:   Deferred    Rectal:   Deferred    Extremities:  No cyanosis, clubbing or edema    Pulses:  2+ and symmetric    Skin:  No jaundice, rashes, or lesions    Lymph nodes:  No palpable cervical lymphadenopathy        Lab Results:   No visits with results within 1 Day(s) from this visit.   Latest known visit with results is:   Appointment on 12/21/2023   Component Date Value    Sodium 12/21/2023 141     Potassium 12/21/2023 4.5     Chloride 12/21/2023 104     CO2 12/21/2023 30     ANION GAP " 12/21/2023 7     BUN 12/21/2023 23     Creatinine 12/21/2023 1.25     Glucose, Fasting 12/21/2023 115 (H)     Calcium 12/21/2023 9.5     AST 12/21/2023 22     ALT 12/21/2023 27     Alkaline Phosphatase 12/21/2023 48     Total Protein 12/21/2023 6.9     Albumin 12/21/2023 4.6     Total Bilirubin 12/21/2023 0.47     eGFR 12/21/2023 66     WBC 12/21/2023 5.70     RBC 12/21/2023 5.07     Hemoglobin 12/21/2023 16.3     Hematocrit 12/21/2023 47.1     MCV 12/21/2023 93     MCH 12/21/2023 32.1     MCHC 12/21/2023 34.6     RDW 12/21/2023 11.7     MPV 12/21/2023 9.2     Platelets 12/21/2023 315     nRBC 12/21/2023 0     Neutrophils Relative 12/21/2023 69     Immat GRANS % 12/21/2023 0     Lymphocytes Relative 12/21/2023 19     Monocytes Relative 12/21/2023 8     Eosinophils Relative 12/21/2023 3     Basophils Relative 12/21/2023 1     Neutrophils Absolute 12/21/2023 3.95     Immature Grans Absolute 12/21/2023 0.01     Lymphocytes Absolute 12/21/2023 1.08     Monocytes Absolute 12/21/2023 0.46     Eosinophils Absolute 12/21/2023 0.15     Basophils Absolute 12/21/2023 0.05     Cholesterol 12/21/2023 195     Triglycerides 12/21/2023 294 (H)     HDL, Direct 12/21/2023 32 (L)     LDL Calculated 12/21/2023 104 (H)     TSH 3RD GENERATON 12/21/2023 1.798     Color, UA 12/21/2023 Light Yellow     Clarity, UA 12/21/2023 Clear     Specific Gravity, UA 12/21/2023 1.012     pH, UA 12/21/2023 6.0     Leukocytes, UA 12/21/2023 Negative     Nitrite, UA 12/21/2023 Negative     Protein, UA 12/21/2023 Negative     Glucose, UA 12/21/2023 Negative     Ketones, UA 12/21/2023 Negative     Urobilinogen, UA 12/21/2023 <2.0     Bilirubin, UA 12/21/2023 Negative     Occult Blood, UA 12/21/2023 Negative     CRP 12/21/2023 12.9 (H)          Radiology Results:   No results found.

## 2024-01-04 DIAGNOSIS — E78.2 MIXED HYPERLIPIDEMIA: ICD-10-CM

## 2024-01-04 RX ORDER — OMEGA-3-ACID ETHYL ESTERS 1 G/1
2 CAPSULE, LIQUID FILLED ORAL 2 TIMES DAILY
Qty: 180 CAPSULE | Refills: 5 | Status: SHIPPED | OUTPATIENT
Start: 2024-01-04

## 2024-01-08 DIAGNOSIS — R10.32 LEFT LOWER QUADRANT ABDOMINAL PAIN: ICD-10-CM

## 2024-01-08 DIAGNOSIS — K58.9 IRRITABLE BOWEL SYNDROME, UNSPECIFIED TYPE: ICD-10-CM

## 2024-01-09 DIAGNOSIS — E78.2 MIXED HYPERLIPIDEMIA: ICD-10-CM

## 2024-01-09 RX ORDER — DICYCLOMINE HCL 20 MG
20 TABLET ORAL EVERY 6 HOURS
Qty: 360 TABLET | Refills: 0 | Status: SHIPPED | OUTPATIENT
Start: 2024-01-09

## 2024-01-09 RX ORDER — OMEGA-3-ACID ETHYL ESTERS 1 G/1
2 CAPSULE, LIQUID FILLED ORAL 2 TIMES DAILY
Qty: 360 CAPSULE | Refills: 5 | Status: SHIPPED | OUTPATIENT
Start: 2024-01-09

## 2024-01-17 ENCOUNTER — TELEPHONE (OUTPATIENT)
Age: 49
End: 2024-01-17

## 2024-01-17 NOTE — TELEPHONE ENCOUNTER
Patients GI provider:  Dr. STOVALL     Number to return call: ( [phone]     Reason for call: [facility]     Scheduled procedure/appointment date if applicable: Apt/procedure  1/19 COLONOSCOPY

## 2024-01-19 ENCOUNTER — ANESTHESIA (OUTPATIENT)
Dept: GASTROENTEROLOGY | Facility: HOSPITAL | Age: 49
End: 2024-01-19

## 2024-01-19 ENCOUNTER — HOSPITAL ENCOUNTER (OUTPATIENT)
Dept: GASTROENTEROLOGY | Facility: HOSPITAL | Age: 49
Setting detail: OUTPATIENT SURGERY
End: 2024-01-19
Payer: COMMERCIAL

## 2024-01-19 ENCOUNTER — ANESTHESIA EVENT (OUTPATIENT)
Dept: GASTROENTEROLOGY | Facility: HOSPITAL | Age: 49
End: 2024-01-19

## 2024-01-19 VITALS
BODY MASS INDEX: 29.3 KG/M2 | WEIGHT: 182.32 LBS | OXYGEN SATURATION: 98 % | RESPIRATION RATE: 18 BRPM | DIASTOLIC BLOOD PRESSURE: 74 MMHG | SYSTOLIC BLOOD PRESSURE: 136 MMHG | HEIGHT: 66 IN | HEART RATE: 63 BPM | TEMPERATURE: 97.7 F

## 2024-01-19 DIAGNOSIS — R10.32 LEFT LOWER QUADRANT ABDOMINAL PAIN: ICD-10-CM

## 2024-01-19 DIAGNOSIS — K58.9 IRRITABLE BOWEL SYNDROME, UNSPECIFIED TYPE: ICD-10-CM

## 2024-01-19 PROCEDURE — C1769 GUIDE WIRE: HCPCS

## 2024-01-19 PROCEDURE — 43239 EGD BIOPSY SINGLE/MULTIPLE: CPT | Performed by: INTERNAL MEDICINE

## 2024-01-19 PROCEDURE — 88305 TISSUE EXAM BY PATHOLOGIST: CPT | Performed by: PATHOLOGY

## 2024-01-19 PROCEDURE — 43248 EGD GUIDE WIRE INSERTION: CPT | Performed by: INTERNAL MEDICINE

## 2024-01-19 PROCEDURE — 45380 COLONOSCOPY AND BIOPSY: CPT | Performed by: INTERNAL MEDICINE

## 2024-01-19 RX ORDER — LIDOCAINE HYDROCHLORIDE 20 MG/ML
INJECTION, SOLUTION EPIDURAL; INFILTRATION; INTRACAUDAL; PERINEURAL AS NEEDED
Status: DISCONTINUED | OUTPATIENT
Start: 2024-01-19 | End: 2024-01-19

## 2024-01-19 RX ORDER — SODIUM CHLORIDE, SODIUM LACTATE, POTASSIUM CHLORIDE, CALCIUM CHLORIDE 600; 310; 30; 20 MG/100ML; MG/100ML; MG/100ML; MG/100ML
INJECTION, SOLUTION INTRAVENOUS CONTINUOUS PRN
Status: DISCONTINUED | OUTPATIENT
Start: 2024-01-19 | End: 2024-01-19

## 2024-01-19 RX ORDER — PROPOFOL 10 MG/ML
INJECTION, EMULSION INTRAVENOUS AS NEEDED
Status: DISCONTINUED | OUTPATIENT
Start: 2024-01-19 | End: 2024-01-19

## 2024-01-19 RX ADMIN — PROPOFOL 50 MG: 10 INJECTION, EMULSION INTRAVENOUS at 10:48

## 2024-01-19 RX ADMIN — PROPOFOL 30 MG: 10 INJECTION, EMULSION INTRAVENOUS at 10:58

## 2024-01-19 RX ADMIN — PROPOFOL 50 MG: 10 INJECTION, EMULSION INTRAVENOUS at 10:53

## 2024-01-19 RX ADMIN — PROPOFOL 50 MG: 10 INJECTION, EMULSION INTRAVENOUS at 10:46

## 2024-01-19 RX ADMIN — PROPOFOL 150 MG: 10 INJECTION, EMULSION INTRAVENOUS at 10:43

## 2024-01-19 RX ADMIN — LIDOCAINE HYDROCHLORIDE 100 MG: 20 INJECTION, SOLUTION EPIDURAL; INFILTRATION; INTRACAUDAL; PERINEURAL at 10:43

## 2024-01-19 RX ADMIN — PROPOFOL 20 MG: 10 INJECTION, EMULSION INTRAVENOUS at 11:00

## 2024-01-19 RX ADMIN — PROPOFOL 50 MG: 10 INJECTION, EMULSION INTRAVENOUS at 10:52

## 2024-01-19 RX ADMIN — SODIUM CHLORIDE, SODIUM LACTATE, POTASSIUM CHLORIDE, AND CALCIUM CHLORIDE: .6; .31; .03; .02 INJECTION, SOLUTION INTRAVENOUS at 10:43

## 2024-01-19 RX ADMIN — PROPOFOL 20 MG: 10 INJECTION, EMULSION INTRAVENOUS at 10:56

## 2024-01-19 RX ADMIN — PROPOFOL 20 MG: 10 INJECTION, EMULSION INTRAVENOUS at 11:04

## 2024-01-19 RX ADMIN — PROPOFOL 50 MG: 10 INJECTION, EMULSION INTRAVENOUS at 10:44

## 2024-01-19 RX ADMIN — PROPOFOL 10 MG: 10 INJECTION, EMULSION INTRAVENOUS at 11:08

## 2024-01-19 NOTE — ANESTHESIA PREPROCEDURE EVALUATION
Procedure:  COLONOSCOPY  EGD    Relevant Problems   CARDIO   (+) Mixed hyperlipidemia      PULMONARY   (+) Uncomplicated asthma   NPO appropriate  Pt hit head against wooden shelf at home a couple of days ago. No ecchymosis or edema noted on head, AO x 3, no focal neuro deficits. Pt denies nausea, ha, changes in vision and speech. Not on AC. No loss of consciousness.     Physical Exam    Airway    Mallampati score: II  TM Distance: >3 FB  Neck ROM: full     Dental       Cardiovascular  Rhythm: regular    Pulmonary   Breath sounds clear to auscultation    Other Findings        Anesthesia Plan  ASA Score- 2     Anesthesia Type- IV sedation with anesthesia with ASA Monitors.         Additional Monitors:     Airway Plan:            Plan Factors-    Chart reviewed.   Existing labs reviewed. Patient summary reviewed.    Patient is not a current smoker.              Induction- intravenous.    Postoperative Plan-     Informed Consent- Anesthetic plan and risks discussed with patient.  I personally reviewed this patient with the CRNA. Discussed and agreed on the Anesthesia Plan with the CRNA..

## 2024-01-19 NOTE — ANESTHESIA POSTPROCEDURE EVALUATION
Post-Op Assessment Note    CV Status:  Stable  Pain Score: 0    Pain management: adequate       Mental Status:  Sleepy   Hydration Status:  Euvolemic   PONV Controlled:  Controlled   Airway Patency:  Patent     Post Op Vitals Reviewed: Yes      Staff: CRNA               /79 (01/19/24 1110)    Temp 97.7 °F (36.5 °C) (01/19/24 1110)    Pulse 61 (01/19/24 1110)   Resp 18 (01/19/24 1110)    SpO2 98 % (01/19/24 1110)

## 2024-01-19 NOTE — INTERVAL H&P NOTE
H&P reviewed. After examining the patient I find no changes in the patients condition since the H&P had been written.    Vitals:    01/19/24 1000   BP: 154/81   Pulse: 92   Resp: 16   Temp: 98.3 °F (36.8 °C)   SpO2: 98%

## 2024-01-22 PROCEDURE — 88305 TISSUE EXAM BY PATHOLOGIST: CPT | Performed by: PATHOLOGY

## 2024-02-02 DIAGNOSIS — E78.2 MIXED HYPERLIPIDEMIA: Primary | ICD-10-CM

## 2024-02-02 RX ORDER — FENOFIBRATE 145 MG/1
145 TABLET, COATED ORAL DAILY
Qty: 90 TABLET | Refills: 0 | Status: SHIPPED | OUTPATIENT
Start: 2024-02-02

## 2024-02-02 NOTE — TELEPHONE ENCOUNTER
Patient called stating his script for Fish Oil needs prior auth for [facility].  Also needs refill for Tricor 145 mg  [facility]

## 2024-04-02 DIAGNOSIS — R10.32 LEFT LOWER QUADRANT ABDOMINAL PAIN: ICD-10-CM

## 2024-04-02 DIAGNOSIS — K58.9 IRRITABLE BOWEL SYNDROME, UNSPECIFIED TYPE: ICD-10-CM

## 2024-04-02 RX ORDER — DICYCLOMINE HCL 20 MG
20 TABLET ORAL EVERY 6 HOURS
Qty: 360 TABLET | Refills: 1 | Status: SHIPPED | OUTPATIENT
Start: 2024-04-02

## 2024-04-02 NOTE — TELEPHONE ENCOUNTER
Reason for call:   [x] Refill   [] Prior Auth  [] Other:     Office:   [] PCP/Provider -   [x] Specialty/Provider - GASTRO    Medication: BENTYL    Dose/Frequency: 20 MG    Quantity: 360    Pharmacy: [facility]    Does the patient have enough for 3 days?   [x] Yes   [] No - Send as HP to POD

## 2024-04-03 ENCOUNTER — HOSPITAL ENCOUNTER (OUTPATIENT)
Facility: HOSPITAL | Age: 49
Setting detail: OUTPATIENT SURGERY
Discharge: HOME | End: 2024-04-04
Attending: EMERGENCY MEDICINE | Admitting: SURGERY
Payer: COMMERCIAL

## 2024-04-03 ENCOUNTER — APPOINTMENT (EMERGENCY)
Dept: RADIOLOGY | Facility: HOSPITAL | Age: 49
End: 2024-04-03
Payer: COMMERCIAL

## 2024-04-03 ENCOUNTER — APPOINTMENT (EMERGENCY)
Dept: CT IMAGING | Facility: HOSPITAL | Age: 49
End: 2024-04-03
Payer: COMMERCIAL

## 2024-04-03 DIAGNOSIS — K37 APPENDICITIS, UNSPECIFIED APPENDICITIS TYPE: Primary | ICD-10-CM

## 2024-04-03 LAB
ALBUMIN SERPL BCP-MCNC: 4.2 G/DL (ref 3.5–5)
ALP SERPL-CCNC: 49 U/L (ref 34–104)
ALT SERPL W P-5'-P-CCNC: 61 U/L (ref 7–52)
ANION GAP SERPL CALCULATED.3IONS-SCNC: 9 MMOL/L (ref 4–13)
AST SERPL W P-5'-P-CCNC: 32 U/L (ref 13–39)
BASOPHILS # BLD AUTO: 0.03 THOUSANDS/ÂΜL (ref 0–0.1)
BASOPHILS NFR BLD AUTO: 0 % (ref 0–1)
BILIRUB SERPL-MCNC: 0.51 MG/DL (ref 0.2–1)
BILIRUB UR QL STRIP: NEGATIVE
BNP SERPL-MCNC: 8 PG/ML (ref 0–100)
BUN SERPL-MCNC: 18 MG/DL (ref 5–25)
CALCIUM SERPL-MCNC: 9.1 MG/DL (ref 8.4–10.2)
CARDIAC TROPONIN I PNL SERPL HS: 5 NG/L
CHLORIDE SERPL-SCNC: 104 MMOL/L (ref 96–108)
CLARITY UR: CLEAR
CO2 SERPL-SCNC: 23 MMOL/L (ref 21–32)
COLOR UR: COLORLESS
CREAT SERPL-MCNC: 0.96 MG/DL (ref 0.6–1.3)
EOSINOPHIL # BLD AUTO: 0.03 THOUSAND/ÂΜL (ref 0–0.61)
EOSINOPHIL NFR BLD AUTO: 0 % (ref 0–6)
ERYTHROCYTE [DISTWIDTH] IN BLOOD BY AUTOMATED COUNT: 11.8 % (ref 11.6–15.1)
FLUAV RNA RESP QL NAA+PROBE: NEGATIVE
FLUBV RNA RESP QL NAA+PROBE: NEGATIVE
GFR SERPL CREATININE-BSD FRML MDRD: 91 ML/MIN/1.73SQ M
GLUCOSE SERPL-MCNC: 179 MG/DL (ref 65–140)
GLUCOSE UR STRIP-MCNC: NEGATIVE MG/DL
HCT VFR BLD AUTO: 42.2 % (ref 36.5–49.3)
HGB BLD-MCNC: 15.4 G/DL (ref 12–17)
HGB UR QL STRIP.AUTO: NEGATIVE
IMM GRANULOCYTES # BLD AUTO: 0.06 THOUSAND/UL (ref 0–0.2)
IMM GRANULOCYTES NFR BLD AUTO: 0 % (ref 0–2)
KETONES UR STRIP-MCNC: NEGATIVE MG/DL
LACTATE SERPL-SCNC: 2.4 MMOL/L (ref 0.5–2)
LEUKOCYTE ESTERASE UR QL STRIP: NEGATIVE
LIPASE SERPL-CCNC: 27 U/L (ref 11–82)
LYMPHOCYTES # BLD AUTO: 0.52 THOUSANDS/ÂΜL (ref 0.6–4.47)
LYMPHOCYTES NFR BLD AUTO: 4 % (ref 14–44)
MCH RBC QN AUTO: 32.1 PG (ref 26.8–34.3)
MCHC RBC AUTO-ENTMCNC: 36.5 G/DL (ref 31.4–37.4)
MCV RBC AUTO: 88 FL (ref 82–98)
MONOCYTES # BLD AUTO: 1.02 THOUSAND/ÂΜL (ref 0.17–1.22)
MONOCYTES NFR BLD AUTO: 7 % (ref 4–12)
NEUTROPHILS # BLD AUTO: 12.41 THOUSANDS/ÂΜL (ref 1.85–7.62)
NEUTS SEG NFR BLD AUTO: 89 % (ref 43–75)
NITRITE UR QL STRIP: NEGATIVE
NRBC BLD AUTO-RTO: 0 /100 WBCS
PH UR STRIP.AUTO: 7 [PH]
PLATELET # BLD AUTO: 195 THOUSANDS/UL (ref 149–390)
PLATELET # BLD AUTO: 210 THOUSANDS/UL (ref 149–390)
PMV BLD AUTO: 8.5 FL (ref 8.9–12.7)
PMV BLD AUTO: 8.6 FL (ref 8.9–12.7)
POTASSIUM SERPL-SCNC: 3.8 MMOL/L (ref 3.5–5.3)
PROT SERPL-MCNC: 6.5 G/DL (ref 6.4–8.4)
PROT UR STRIP-MCNC: NEGATIVE MG/DL
RBC # BLD AUTO: 4.8 MILLION/UL (ref 3.88–5.62)
RSV RNA RESP QL NAA+PROBE: NEGATIVE
SARS-COV-2 RNA RESP QL NAA+PROBE: NEGATIVE
SODIUM SERPL-SCNC: 136 MMOL/L (ref 135–147)
SP GR UR STRIP.AUTO: >=1.05 (ref 1–1.03)
UROBILINOGEN UR STRIP-ACNC: <2 MG/DL
WBC # BLD AUTO: 14.07 THOUSAND/UL (ref 4.31–10.16)

## 2024-04-03 PROCEDURE — 83605 ASSAY OF LACTIC ACID: CPT

## 2024-04-03 PROCEDURE — 36415 COLL VENOUS BLD VENIPUNCTURE: CPT

## 2024-04-03 PROCEDURE — 74177 CT ABD & PELVIS W/CONTRAST: CPT

## 2024-04-03 PROCEDURE — 85049 AUTOMATED PLATELET COUNT: CPT | Performed by: SURGERY

## 2024-04-03 PROCEDURE — 96365 THER/PROPH/DIAG IV INF INIT: CPT

## 2024-04-03 PROCEDURE — 83690 ASSAY OF LIPASE: CPT | Performed by: EMERGENCY MEDICINE

## 2024-04-03 PROCEDURE — 99284 EMERGENCY DEPT VISIT MOD MDM: CPT

## 2024-04-03 PROCEDURE — 71046 X-RAY EXAM CHEST 2 VIEWS: CPT

## 2024-04-03 PROCEDURE — 0241U HB NFCT DS VIR RESP RNA 4 TRGT: CPT

## 2024-04-03 PROCEDURE — 85025 COMPLETE CBC W/AUTO DIFF WBC: CPT | Performed by: EMERGENCY MEDICINE

## 2024-04-03 PROCEDURE — 99285 EMERGENCY DEPT VISIT HI MDM: CPT

## 2024-04-03 PROCEDURE — 83880 ASSAY OF NATRIURETIC PEPTIDE: CPT

## 2024-04-03 PROCEDURE — 96375 TX/PRO/DX INJ NEW DRUG ADDON: CPT

## 2024-04-03 PROCEDURE — 84484 ASSAY OF TROPONIN QUANT: CPT

## 2024-04-03 PROCEDURE — 80053 COMPREHEN METABOLIC PANEL: CPT | Performed by: EMERGENCY MEDICINE

## 2024-04-03 PROCEDURE — C9113 INJ PANTOPRAZOLE SODIUM, VIA: HCPCS

## 2024-04-03 PROCEDURE — 93005 ELECTROCARDIOGRAM TRACING: CPT

## 2024-04-03 PROCEDURE — 96361 HYDRATE IV INFUSION ADD-ON: CPT

## 2024-04-03 PROCEDURE — 81003 URINALYSIS AUTO W/O SCOPE: CPT

## 2024-04-03 RX ORDER — ONDANSETRON 2 MG/ML
4 INJECTION INTRAMUSCULAR; INTRAVENOUS ONCE
Status: COMPLETED | OUTPATIENT
Start: 2024-04-03 | End: 2024-04-03

## 2024-04-03 RX ORDER — ACETAMINOPHEN 10 MG/ML
1000 INJECTION, SOLUTION INTRAVENOUS ONCE
Status: COMPLETED | OUTPATIENT
Start: 2024-04-03 | End: 2024-04-03

## 2024-04-03 RX ORDER — MAGNESIUM HYDROXIDE/ALUMINUM HYDROXICE/SIMETHICONE 120; 1200; 1200 MG/30ML; MG/30ML; MG/30ML
15 SUSPENSION ORAL ONCE
Status: COMPLETED | OUTPATIENT
Start: 2024-04-03 | End: 2024-04-03

## 2024-04-03 RX ORDER — PANTOPRAZOLE SODIUM 40 MG/10ML
40 INJECTION, POWDER, LYOPHILIZED, FOR SOLUTION INTRAVENOUS ONCE
Status: COMPLETED | OUTPATIENT
Start: 2024-04-03 | End: 2024-04-03

## 2024-04-03 RX ORDER — SODIUM CHLORIDE, SODIUM LACTATE, POTASSIUM CHLORIDE, CALCIUM CHLORIDE 600; 310; 30; 20 MG/100ML; MG/100ML; MG/100ML; MG/100ML
125 INJECTION, SOLUTION INTRAVENOUS CONTINUOUS
Status: DISCONTINUED | OUTPATIENT
Start: 2024-04-03 | End: 2024-04-04 | Stop reason: HOSPADM

## 2024-04-03 RX ORDER — HEPARIN SODIUM 5000 [USP'U]/ML
5000 INJECTION, SOLUTION INTRAVENOUS; SUBCUTANEOUS EVERY 8 HOURS SCHEDULED
Status: DISCONTINUED | OUTPATIENT
Start: 2024-04-03 | End: 2024-04-04 | Stop reason: HOSPADM

## 2024-04-03 RX ORDER — ONDANSETRON 2 MG/ML
4 INJECTION INTRAMUSCULAR; INTRAVENOUS EVERY 6 HOURS PRN
Status: DISCONTINUED | OUTPATIENT
Start: 2024-04-03 | End: 2024-04-04 | Stop reason: HOSPADM

## 2024-04-03 RX ORDER — CEFAZOLIN SODIUM 2 G/50ML
2000 SOLUTION INTRAVENOUS EVERY 8 HOURS
Status: COMPLETED | OUTPATIENT
Start: 2024-04-03 | End: 2024-04-03

## 2024-04-03 RX ORDER — FAMOTIDINE 10 MG/ML
20 INJECTION, SOLUTION INTRAVENOUS ONCE
Status: COMPLETED | OUTPATIENT
Start: 2024-04-03 | End: 2024-04-03

## 2024-04-03 RX ORDER — METRONIDAZOLE 500 MG/100ML
500 INJECTION, SOLUTION INTRAVENOUS EVERY 8 HOURS
Status: DISCONTINUED | OUTPATIENT
Start: 2024-04-03 | End: 2024-04-04 | Stop reason: HOSPADM

## 2024-04-03 RX ADMIN — ALUMINUM HYDROXIDE, MAGNESIUM HYDROXIDE, AND DIMETHICONE 15 ML: 200; 20; 200 SUSPENSION ORAL at 21:18

## 2024-04-03 RX ADMIN — FAMOTIDINE 20 MG: 10 INJECTION, SOLUTION INTRAVENOUS at 21:47

## 2024-04-03 RX ADMIN — IOHEXOL 100 ML: 350 INJECTION, SOLUTION INTRAVENOUS at 20:49

## 2024-04-03 RX ADMIN — ACETAMINOPHEN 1000 MG: 10 INJECTION INTRAVENOUS at 21:19

## 2024-04-03 RX ADMIN — ONDANSETRON 4 MG: 2 INJECTION INTRAMUSCULAR; INTRAVENOUS at 23:11

## 2024-04-03 RX ADMIN — MORPHINE SULFATE 2 MG: 2 INJECTION, SOLUTION INTRAMUSCULAR; INTRAVENOUS at 23:33

## 2024-04-03 RX ADMIN — ONDANSETRON 4 MG: 2 INJECTION INTRAMUSCULAR; INTRAVENOUS at 21:18

## 2024-04-03 RX ADMIN — METRONIDAZOLE 500 MG: 500 INJECTION, SOLUTION INTRAVENOUS at 23:33

## 2024-04-03 RX ADMIN — PANTOPRAZOLE SODIUM 40 MG: 40 INJECTION, POWDER, FOR SOLUTION INTRAVENOUS at 21:18

## 2024-04-03 RX ADMIN — SODIUM CHLORIDE 1000 ML: 0.9 INJECTION, SOLUTION INTRAVENOUS at 21:05

## 2024-04-03 RX ADMIN — HEPARIN SODIUM 5000 UNITS: 5000 INJECTION INTRAVENOUS; SUBCUTANEOUS at 23:01

## 2024-04-03 RX ADMIN — CEFAZOLIN SODIUM 2000 MG: 2 SOLUTION INTRAVENOUS at 23:01

## 2024-04-04 ENCOUNTER — ANESTHESIA (OUTPATIENT)
Dept: PERIOP | Facility: HOSPITAL | Age: 49
End: 2024-04-04
Payer: COMMERCIAL

## 2024-04-04 ENCOUNTER — ANESTHESIA EVENT (OUTPATIENT)
Dept: PERIOP | Facility: HOSPITAL | Age: 49
End: 2024-04-04
Payer: COMMERCIAL

## 2024-04-04 VITALS
OXYGEN SATURATION: 99 % | TEMPERATURE: 98.3 F | HEART RATE: 93 BPM | SYSTOLIC BLOOD PRESSURE: 125 MMHG | RESPIRATION RATE: 19 BRPM | DIASTOLIC BLOOD PRESSURE: 83 MMHG

## 2024-04-04 PROBLEM — K37 APPENDICITIS: Status: RESOLVED | Noted: 2024-04-03 | Resolved: 2024-04-04

## 2024-04-04 LAB
2HR DELTA HS TROPONIN: 1 NG/L
4HR DELTA HS TROPONIN: 0 NG/L
ATRIAL RATE: 99 BPM
CARDIAC TROPONIN I PNL SERPL HS: 5 NG/L
CARDIAC TROPONIN I PNL SERPL HS: 6 NG/L
LACTATE SERPL-SCNC: 1.7 MMOL/L (ref 0.5–2)
P AXIS: 58 DEGREES
PR INTERVAL: 140 MS
QRS AXIS: 64 DEGREES
QRSD INTERVAL: 82 MS
QT INTERVAL: 326 MS
QTC INTERVAL: 418 MS
T WAVE AXIS: 2 DEGREES
VENTRICULAR RATE: 99 BPM

## 2024-04-04 PROCEDURE — 36415 COLL VENOUS BLD VENIPUNCTURE: CPT

## 2024-04-04 PROCEDURE — 44970 LAPAROSCOPY APPENDECTOMY: CPT | Performed by: STUDENT IN AN ORGANIZED HEALTH CARE EDUCATION/TRAINING PROGRAM

## 2024-04-04 PROCEDURE — 84484 ASSAY OF TROPONIN QUANT: CPT

## 2024-04-04 PROCEDURE — NC001 PR NO CHARGE: Performed by: CLINICAL NURSE SPECIALIST

## 2024-04-04 PROCEDURE — 44970 LAPAROSCOPY APPENDECTOMY: CPT | Performed by: CLINICAL NURSE SPECIALIST

## 2024-04-04 PROCEDURE — 88304 TISSUE EXAM BY PATHOLOGIST: CPT | Performed by: PATHOLOGY

## 2024-04-04 PROCEDURE — 93010 ELECTROCARDIOGRAM REPORT: CPT | Performed by: INTERNAL MEDICINE

## 2024-04-04 RX ORDER — HYDROCODONE BITARTRATE AND ACETAMINOPHEN 5; 325 MG/1; MG/1
1 TABLET ORAL EVERY 6 HOURS PRN
Qty: 10 TABLET | Refills: 0 | Status: SHIPPED | OUTPATIENT
Start: 2024-04-04 | End: 2024-04-14

## 2024-04-04 RX ORDER — BUPIVACAINE HYDROCHLORIDE 2.5 MG/ML
INJECTION, SOLUTION EPIDURAL; INFILTRATION; INTRACAUDAL AS NEEDED
Status: DISCONTINUED | OUTPATIENT
Start: 2024-04-04 | End: 2024-04-04 | Stop reason: HOSPADM

## 2024-04-04 RX ORDER — FENTANYL CITRATE/PF 50 MCG/ML
50 SYRINGE (ML) INJECTION
Status: DISCONTINUED | OUTPATIENT
Start: 2024-04-04 | End: 2024-04-04 | Stop reason: HOSPADM

## 2024-04-04 RX ORDER — MAGNESIUM HYDROXIDE 1200 MG/15ML
LIQUID ORAL AS NEEDED
Status: DISCONTINUED | OUTPATIENT
Start: 2024-04-04 | End: 2024-04-04 | Stop reason: HOSPADM

## 2024-04-04 RX ORDER — DEXAMETHASONE SODIUM PHOSPHATE 10 MG/ML
INJECTION, SOLUTION INTRAMUSCULAR; INTRAVENOUS AS NEEDED
Status: DISCONTINUED | OUTPATIENT
Start: 2024-04-04 | End: 2024-04-04

## 2024-04-04 RX ORDER — PROPOFOL 10 MG/ML
INJECTION, EMULSION INTRAVENOUS AS NEEDED
Status: DISCONTINUED | OUTPATIENT
Start: 2024-04-04 | End: 2024-04-04

## 2024-04-04 RX ORDER — ALBUTEROL SULFATE 90 UG/1
AEROSOL, METERED RESPIRATORY (INHALATION) AS NEEDED
Status: DISCONTINUED | OUTPATIENT
Start: 2024-04-04 | End: 2024-04-04

## 2024-04-04 RX ORDER — ONDANSETRON 2 MG/ML
4 INJECTION INTRAMUSCULAR; INTRAVENOUS ONCE AS NEEDED
Status: DISCONTINUED | OUTPATIENT
Start: 2024-04-04 | End: 2024-04-04 | Stop reason: HOSPADM

## 2024-04-04 RX ORDER — ONDANSETRON 2 MG/ML
INJECTION INTRAMUSCULAR; INTRAVENOUS AS NEEDED
Status: DISCONTINUED | OUTPATIENT
Start: 2024-04-04 | End: 2024-04-04

## 2024-04-04 RX ORDER — FENTANYL CITRATE 50 UG/ML
INJECTION, SOLUTION INTRAMUSCULAR; INTRAVENOUS AS NEEDED
Status: DISCONTINUED | OUTPATIENT
Start: 2024-04-04 | End: 2024-04-04

## 2024-04-04 RX ORDER — METRONIDAZOLE 500 MG/100ML
500 INJECTION, SOLUTION INTRAVENOUS ONCE
Status: DISCONTINUED | OUTPATIENT
Start: 2024-04-04 | End: 2024-04-04 | Stop reason: SDUPTHER

## 2024-04-04 RX ORDER — ROCURONIUM BROMIDE 10 MG/ML
INJECTION, SOLUTION INTRAVENOUS AS NEEDED
Status: DISCONTINUED | OUTPATIENT
Start: 2024-04-04 | End: 2024-04-04

## 2024-04-04 RX ORDER — CEFAZOLIN SODIUM 2 G/50ML
2000 SOLUTION INTRAVENOUS ONCE
Status: COMPLETED | OUTPATIENT
Start: 2024-04-04 | End: 2024-04-04

## 2024-04-04 RX ORDER — HYDROMORPHONE HCL IN WATER/PF 6 MG/30 ML
0.2 PATIENT CONTROLLED ANALGESIA SYRINGE INTRAVENOUS
Status: DISCONTINUED | OUTPATIENT
Start: 2024-04-04 | End: 2024-04-04 | Stop reason: HOSPADM

## 2024-04-04 RX ORDER — MIDAZOLAM HYDROCHLORIDE 2 MG/2ML
INJECTION, SOLUTION INTRAMUSCULAR; INTRAVENOUS AS NEEDED
Status: DISCONTINUED | OUTPATIENT
Start: 2024-04-04 | End: 2024-04-04

## 2024-04-04 RX ORDER — LIDOCAINE HYDROCHLORIDE 20 MG/ML
INJECTION, SOLUTION EPIDURAL; INFILTRATION; INTRACAUDAL; PERINEURAL AS NEEDED
Status: DISCONTINUED | OUTPATIENT
Start: 2024-04-04 | End: 2024-04-04

## 2024-04-04 RX ORDER — AMOXICILLIN AND CLAVULANATE POTASSIUM 875; 125 MG/1; MG/1
1 TABLET, FILM COATED ORAL EVERY 12 HOURS SCHEDULED
Qty: 10 TABLET | Refills: 0 | Status: SHIPPED | OUTPATIENT
Start: 2024-04-04 | End: 2024-04-09

## 2024-04-04 RX ADMIN — PROPOFOL 100 MG: 10 INJECTION, EMULSION INTRAVENOUS at 11:13

## 2024-04-04 RX ADMIN — HEPARIN SODIUM 5000 UNITS: 5000 INJECTION INTRAVENOUS; SUBCUTANEOUS at 11:29

## 2024-04-04 RX ADMIN — DEXAMETHASONE SODIUM PHOSPHATE 10 MG: 10 INJECTION INTRAMUSCULAR; INTRAVENOUS at 11:07

## 2024-04-04 RX ADMIN — SUGAMMADEX 200 MG: 100 INJECTION, SOLUTION INTRAVENOUS at 12:25

## 2024-04-04 RX ADMIN — CEFAZOLIN SODIUM 2000 MG: 2 SOLUTION INTRAVENOUS at 11:05

## 2024-04-04 RX ADMIN — FENTANYL CITRATE 50 MCG: 50 INJECTION, SOLUTION INTRAMUSCULAR; INTRAVENOUS at 11:05

## 2024-04-04 RX ADMIN — PROPOFOL 200 MG: 10 INJECTION, EMULSION INTRAVENOUS at 11:07

## 2024-04-04 RX ADMIN — MIDAZOLAM HYDROCHLORIDE 2 MG: 1 INJECTION, SOLUTION INTRAMUSCULAR; INTRAVENOUS at 11:05

## 2024-04-04 RX ADMIN — ONDANSETRON 4 MG: 2 INJECTION INTRAMUSCULAR; INTRAVENOUS at 06:44

## 2024-04-04 RX ADMIN — MORPHINE SULFATE 2 MG: 2 INJECTION, SOLUTION INTRAMUSCULAR; INTRAVENOUS at 06:44

## 2024-04-04 RX ADMIN — FENTANYL CITRATE 50 MCG: 50 INJECTION, SOLUTION INTRAMUSCULAR; INTRAVENOUS at 11:36

## 2024-04-04 RX ADMIN — LIDOCAINE HYDROCHLORIDE 100 MG: 20 INJECTION, SOLUTION EPIDURAL; INFILTRATION; INTRACAUDAL; PERINEURAL at 11:07

## 2024-04-04 RX ADMIN — METRONIDAZOLE: 500 INJECTION, SOLUTION INTRAVENOUS at 11:32

## 2024-04-04 RX ADMIN — FENTANYL CITRATE 50 MCG: 50 INJECTION, SOLUTION INTRAMUSCULAR; INTRAVENOUS at 12:05

## 2024-04-04 RX ADMIN — SODIUM CHLORIDE, SODIUM LACTATE, POTASSIUM CHLORIDE, AND CALCIUM CHLORIDE 125 ML/HR: .6; .31; .03; .02 INJECTION, SOLUTION INTRAVENOUS at 01:19

## 2024-04-04 RX ADMIN — ALBUTEROL SULFATE 6 PUFF: 90 AEROSOL, METERED RESPIRATORY (INHALATION) at 12:23

## 2024-04-04 RX ADMIN — ROCURONIUM BROMIDE 50 MG: 10 INJECTION, SOLUTION INTRAVENOUS at 11:07

## 2024-04-04 RX ADMIN — ONDANSETRON 4 MG: 2 INJECTION INTRAMUSCULAR; INTRAVENOUS at 11:07

## 2024-04-04 RX ADMIN — METRONIDAZOLE 500 MG: 500 INJECTION, SOLUTION INTRAVENOUS at 09:17

## 2024-04-04 NOTE — QUICK NOTE
48-year-old male with acute appendicitis.  Plan for laparoscopic appendectomy, possible open.    All risks, benefits, alternatives of the procedure were discussed at length with the patient.  Risks include bleeding, infection, damage to surrounding structures.  All questions were answered to satisfaction.  The patient voiced understanding and signed consent.

## 2024-04-04 NOTE — ED NOTES
Kofi text sent to Dr. Sandhu to confirm if IV abx and heparin SC should be given now or pre-op.      Yun Paul RN  04/03/24 8373

## 2024-04-04 NOTE — ANESTHESIA POSTPROCEDURE EVALUATION
Post-Op Assessment Note    CV Status:  Stable  Pain Score: 0    Pain management: adequate       Mental Status:  Awake and sleepy   Hydration Status:  Euvolemic   PONV Controlled:  Controlled   Airway Patency:  Patent     Post Op Vitals Reviewed: Yes    No anethesia notable event occurred.    Staff: CRNA               BP   144/75   Temp 98.3   Pulse 102   Resp 15   SpO2 95

## 2024-04-04 NOTE — DISCHARGE INSTR - AVS FIRST PAGE
Follow up:  Following discharge from the hospital call the office in 1-2 days to set up a post operative appointment to be seen in 2 weeks.  [phone]  Call the office if you have increased pain not relieved with pain medicine.  Call the office if you have a fever,redness, the wound opens up, you have pus draining from your incision.     AFTER YOU LEAVE: Following discharge from the hospital, you may have some questions about your procedure, your activities or your general condition.  These instructions may answer some of your questions and help you adjust during the first few days following your operation.  You can expect to be sore and tender mostly around the incisions. This pain should last approximally 5 days and gradually improve daily.          Incisions:  You may apply ice to the incisions to help with pain.  It is normal to have some bruising, swelling or mild discoloration around the incision.  If increasing redness or pain develops, call our office immediately.   Do not apply any creams, lotions, or ointments.    If you have dressings:  You may remove the gauze dressing from your incisions 48 hours after surgery. Underneath this dressing is a tape like dressing called Steri Strips. Leave the steri strips in place for 5-7 days. They may fall off on their own. This is okay.     Bathing:   You may shower daily with soap and water the day after the procedure. It is OK to GENTLY wash the incision with soap and water then pat dry.  DO NOT SCRUB. Do NOT soak incision in a tub, pool, or hot tub for 2 weeks.    Diet:   Resume your normal diet unless specified otherwise.  We recommend you slowly advance your diet. Try to start with softer bland foods and gradually advance as tolerated. Be sure to consume plenty of water.  Avoid alcohol.        Activity/Restrictions:   The evening following the procedure you should rest as much as possible, sitting, lying or reclining.  you should be sure someone remains with you  until the next morning.  Gradually increase your activity daily. Walking 3-4 times daily is good and stairs are ok.  Listen to your body.  If you start to get tired or sore then rest.   No strenuous activity or exercise for 3-4 weeks.   No heavy lifting, pushing or pulling.   No driving for 5 days or while taking narcotics for pain.       Return or work:   You may return to work or other activities as soon as your pain is controlled and you feel comfortable. For many people, this is 5 to 7 days after surgery. If your job requires heavy lifting you will need to be on light duty for 2-3 weeks.     Medication:   If you were given a prescription for Percocet, Norco, or Vicodin for pain be sure to eat prior to taking as these medications as they may cause nausea and vomiting on an empty stomach.    If you do not want to take stronger medications for pain, you may take Tylenol, Aleve OR Ibuprofen  DO NOT take Tylenol  (acetaminophen) with these medication for a fever or for further pain control as these medications already contain Tylenol in them. Take one or the other.  Do not exceed more than 4000 mg of acetaminophen in 24 hours or 3000 mg if you have liver disease.   If you were given an antibiotic take it until it is finished.    Constipation:   Patients often experience constipation after surgery.  You may take a stool softener (Colace) to help prevent constipation.    If you experience significant nausea or vomiting after abdominal surgery, call the office before trying any stronger medications or laxatives  Call the office if you haven't had a Bowel movement in 2-3days after surgery    Contact your healthcare provider if:   You have a fever over 101°F (38°C) or chills.    You have pain or nausea that is not relieved by medicine.    You have redness and swelling around your incisions, or blood or pus is leaking from your incisions.    You are constipated or have diarrhea.    Your skin or eyes are yellow, or your  bowel movements are pale.    You have questions or concerns about your surgery, condition, or care.    Seek care immediately or call 911 if:   You cannot stop vomiting.    Your bowel movements are black or bloody.    You have pain in your abdomen and it is swollen or hard.    Your arm or leg feels warm, tender, and painful. It may look swollen and red.   You feel lightheaded, short of breath, and have chest pain.    You cough up blood.

## 2024-04-04 NOTE — ED NOTES
Assumed care of this patient. Pt is resting comfortably at this time.     Jw Kidd, RN  04/04/24 0752

## 2024-04-04 NOTE — H&P
H&P Exam - General Surgery   Chaim Bunn 48 y.o. male MRN: [id number]  Unit/Bed#: ED 29 Encounter: [id number]    Assessment/Plan     Chaim Bunn is a 48 y.o. male with acute appendicitis.    AVSS, WBC 14, lactate 1.7 from 2.4, remainder of labs within normal limits    CTAP: Acute appendicitis    Plan:  OR today for laparoscopic appendectomy  N.p.o.  IVF  Continue IV antibiotics for intra-abdominal infection  PRN pain medication and anti-emetics  Encourage ambulation TID  DVT ppx: heparin  Continue home medications as prescribed   General surgery is primary    History of Present Illness     HPI:  Chaim Bunn is a 48 y.o. male with a past medical history of asthma who presents with right lower quadrant abdominal pain.  Patient reports his pain started last night.  He reports associated nausea but no vomiting.  Also reports no bowel function since yesterday.  Fever and chills at home.  No history of similar symptoms however he does have chronic left-sided abdominal pain for which she got a colonoscopy in January which was negative.  He also has a large midline ventral hernia that seems to bother him from a laparoscopic cholecystectomy in 1990s. The patient denies CP, SOB, palpitations, headache, fever, chills, unintentional weight loss, night sweats, vomiting, constipation, diarrhea.    Surgical history including laparoscopic cholecystectomy in the 90's and vasectomy    Review of Systems   Constitutional:  Positive for chills and fever.   HENT:  Negative for ear pain and sore throat.    Eyes:  Negative for pain and visual disturbance.   Respiratory:  Negative for cough and shortness of breath.    Cardiovascular:  Negative for chest pain and palpitations.   Gastrointestinal:  Positive for abdominal pain (RLQ) and nausea. Negative for constipation, diarrhea and vomiting.   Genitourinary:  Negative for dysuria and hematuria.   Musculoskeletal:  Negative for arthralgias and back pain.   Skin:  Negative for  color change and rash.   Neurological:  Negative for seizures and syncope.   All other systems reviewed and are negative.      Historical Information   Past Medical History:   Diagnosis Date    Asthma     Erectile dysfunction 2013    Urinary urgency      Past Surgical History:   Procedure Laterality Date    ANKLE SURGERY Right     CHOLECYSTECTOMY      COLONOSCOPY      EGD      VASECTOMY       Social History   Social History     Substance and Sexual Activity   Alcohol Use Not Currently     Social History     Substance and Sexual Activity   Drug Use Never     Social History     Tobacco Use   Smoking Status Never    Passive exposure: Past   Smokeless Tobacco Never     Family History: non-contributory    Meds/Allergies   all medications and allergies reviewed  Allergies   Allergen Reactions    Tilactase Diarrhea    Codeine Abdominal Pain and Myalgia    Latex Swelling    Morphine And Related Abdominal Pain    Wheat Bran - Food Allergy Diarrhea       Objective   First Vitals:   Blood Pressure: 146/90 (04/03/24 2004)  Pulse: (!) 107 (04/03/24 2004)  Temperature: 97.5 °F (36.4 °C) (04/03/24 2004)  Temp Source: Temporal (04/03/24 2004)  Respirations: 18 (04/03/24 2004)  SpO2: 97 % (04/03/24 2004)    Current Vitals:   Blood Pressure: 128/77 (04/04/24 0100)  Pulse: 98 (04/04/24 0615)  Temperature: 97.5 °F (36.4 °C) (04/03/24 2004)  Temp Source: Temporal (04/03/24 2004)  Respirations: 18 (04/04/24 0615)  SpO2: 98 % (04/04/24 0615)    No intake or output data in the 24 hours ending 04/04/24 0849    Invasive Devices       Peripheral Intravenous Line  Duration             Peripheral IV 04/03/24 Right;Ventral (anterior) Forearm 1 day                    Physical Exam  Vitals reviewed.   Constitutional:       General: He is not in acute distress.     Appearance: Normal appearance. He is obese. He is not ill-appearing or toxic-appearing.   HENT:      Head: Normocephalic and atraumatic.      Right Ear: External ear normal.      Left  Ear: External ear normal.      Nose: Nose normal.      Mouth/Throat:      Mouth: Mucous membranes are moist.   Eyes:      General: No scleral icterus.        Right eye: No discharge.         Left eye: No discharge.      Conjunctiva/sclera: Conjunctivae normal.   Cardiovascular:      Rate and Rhythm: Normal rate and regular rhythm.   Pulmonary:      Effort: Pulmonary effort is normal. No respiratory distress.   Abdominal:      General: There is no distension.      Palpations: Abdomen is soft.      Tenderness: There is abdominal tenderness (very TTP in the RLQ). There is no guarding.      Hernia: A hernia (midline ventral hernia) is present.   Musculoskeletal:         General: Normal range of motion.      Cervical back: Normal range of motion.   Skin:     General: Skin is warm.      Coloration: Skin is not jaundiced.   Neurological:      General: No focal deficit present.      Mental Status: He is alert and oriented to person, place, and time.   Psychiatric:         Mood and Affect: Mood normal.         Behavior: Behavior normal.         Thought Content: Thought content normal.         Judgment: Judgment normal.         Lab Results: I have personally reviewed pertinent lab results.    Recent Results (from the past 36 hour(s))   CBC and differential    Collection Time: 04/03/24  8:07 PM   Result Value Ref Range    WBC 14.07 (H) 4.31 - 10.16 Thousand/uL    RBC 4.80 3.88 - 5.62 Million/uL    Hemoglobin 15.4 12.0 - 17.0 g/dL    Hematocrit 42.2 36.5 - 49.3 %    MCV 88 82 - 98 fL    MCH 32.1 26.8 - 34.3 pg    MCHC 36.5 31.4 - 37.4 g/dL    RDW 11.8 11.6 - 15.1 %    MPV 8.5 (L) 8.9 - 12.7 fL    Platelets 195 149 - 390 Thousands/uL    nRBC 0 /100 WBCs    Neutrophils Relative 89 (H) 43 - 75 %    Immature Grans % 0 0 - 2 %    Lymphocytes Relative 4 (L) 14 - 44 %    Monocytes Relative 7 4 - 12 %    Eosinophils Relative 0 0 - 6 %    Basophils Relative 0 0 - 1 %    Neutrophils Absolute 12.41 (H) 1.85 - 7.62 Thousands/µL    Absolute  "Immature Grans 0.06 0.00 - 0.20 Thousand/uL    Absolute Lymphocytes 0.52 (L) 0.60 - 4.47 Thousands/µL    Absolute Monocytes 1.02 0.17 - 1.22 Thousand/µL    Eosinophils Absolute 0.03 0.00 - 0.61 Thousand/µL    Basophils Absolute 0.03 0.00 - 0.10 Thousands/µL   Comprehensive metabolic panel    Collection Time: 04/03/24  8:07 PM   Result Value Ref Range    Sodium 136 135 - 147 mmol/L    Potassium 3.8 3.5 - 5.3 mmol/L    Chloride 104 96 - 108 mmol/L    CO2 23 21 - 32 mmol/L    ANION GAP 9 4 - 13 mmol/L    BUN 18 5 - 25 mg/dL    Creatinine 0.96 0.60 - 1.30 mg/dL    Glucose 179 (H) 65 - 140 mg/dL    Calcium 9.1 8.4 - 10.2 mg/dL    AST 32 13 - 39 U/L    ALT 61 (H) 7 - 52 U/L    Alkaline Phosphatase 49 34 - 104 U/L    Total Protein 6.5 6.4 - 8.4 g/dL    Albumin 4.2 3.5 - 5.0 g/dL    Total Bilirubin 0.51 0.20 - 1.00 mg/dL    eGFR 91 ml/min/1.73sq m   Lipase    Collection Time: 04/03/24  8:07 PM   Result Value Ref Range    Lipase 27 11 - 82 u/L   HS Troponin 0hr (reflex protocol)    Collection Time: 04/03/24  9:05 PM   Result Value Ref Range    hs TnI 0hr 5 \"Refer to ACS Flowchart\"- see link ng/L   B-Type Natriuretic Peptide(BNP)    Collection Time: 04/03/24  9:05 PM   Result Value Ref Range    BNP 8 0 - 100 pg/mL   Lactic acid, plasma (w/reflex if result > 2.0)    Collection Time: 04/03/24  9:05 PM   Result Value Ref Range    LACTIC ACID 2.4 (HH) 0.5 - 2.0 mmol/L   FLU/RSV/COVID - if FLU/RSV clinically relevant    Collection Time: 04/03/24  9:15 PM    Specimen: Nose; Nares   Result Value Ref Range    SARS-CoV-2 Negative Negative    INFLUENZA A PCR Negative Negative    INFLUENZA B PCR Negative Negative    RSV PCR Negative Negative   ECG 12 lead    Collection Time: 04/03/24  9:15 PM   Result Value Ref Range    Ventricular Rate 99 BPM    Atrial Rate 99 BPM    MO Interval 140 ms    QRSD Interval 82 ms    QT Interval 326 ms    QTC Interval 418 ms    P Axis 58 degrees    QRS Axis 64 degrees    T Wave Axis 2 degrees   UA w Reflex " "to Microscopic w Reflex to Culture    Collection Time: 04/03/24 10:03 PM    Specimen: Urine, Clean Catch   Result Value Ref Range    Color, UA Colorless     Clarity, UA Clear     Specific Gravity, UA >=1.050 (H) 1.003 - 1.030    pH, UA 7.0 4.5, 5.0, 5.5, 6.0, 6.5, 7.0, 7.5, 8.0    Leukocytes, UA Negative Negative    Nitrite, UA Negative Negative    Protein, UA Negative Negative mg/dl    Glucose, UA Negative Negative mg/dl    Ketones, UA Negative Negative mg/dl    Urobilinogen, UA <2.0 <2.0 mg/dl mg/dl    Bilirubin, UA Negative Negative    Occult Blood, UA Negative Negative   Lactic acid 2 Hours    Collection Time: 04/03/24 11:42 PM   Result Value Ref Range    LACTIC ACID 1.7 0.5 - 2.0 mmol/L   Platelet count    Collection Time: 04/03/24 11:42 PM   Result Value Ref Range    Platelets 210 149 - 390 Thousands/uL    MPV 8.6 (L) 8.9 - 12.7 fL   HS Troponin I 2hr    Collection Time: 04/03/24 11:44 PM   Result Value Ref Range    hs TnI 2hr 6 \"Refer to ACS Flowchart\"- see link ng/L    Delta 2hr hsTnI 1 <20 ng/L   HS Troponin I 4hr    Collection Time: 04/04/24  1:19 AM   Result Value Ref Range    hs TnI 4hr 5 \"Refer to ACS Flowchart\"- see link ng/L    Delta 4hr hsTnI 0 <20 ng/L     Imaging: I have personally reviewed pertinent reports.    XR chest 2 views    Result Date: 4/4/2024  Impression: No acute cardiopulmonary disease. Workstation performed: [id number]     CT abdomen pelvis with contrast    Result Date: 4/3/2024  Impression: Acute uncomplicated appendicitis Workstation performed: [id number]        EKG, Pathology, and Other Studies: I have personally reviewed pertinent reports.      "

## 2024-04-04 NOTE — ED PROVIDER NOTES
History  Chief Complaint   Patient presents with    Abdominal Pain     LUQ and LLQ abdominal pain for two years, patient reports increased pain for the past two weeks, worse tonight. Patient reports following with gastro, recently had endoscopy and colonoscopy that reportedly came back normal. Patient reports having bowel movements but stool is harder nan normal.      Patient is a 48-year-old male with with a past medical history of s/p cholecystectomy, IBS, chronic abdominal pain for the past 2 years, who presents today for worsening abdominal pain.  Patient reports EGD and colonoscopy performed on 01/19/2024.  Reports worsening abdominal pain for the past x2 weeks. Reports today x1 day of left-sided severe abdominal pain with associated subjective fever, diaphoresis and nausea. Reports abdominal pain radiates up to the left-side of his chest. Denies any other symptoms at this time.  Last bowel movement today with hard stools.  Patient tried Bentyl at 8:30 p.m.          Prior to Admission Medications   Prescriptions Last Dose Informant Patient Reported? Taking?   Alpha-Lipoic Acid 600 MG TABS  Self Yes No   Sig: Take by mouth daily   Arnuity Ellipta 100 MCG/ACT AEPB inhaler  Self Yes No   Sig: daily   Cholecalciferol 50 MCG (2000 UT) TABS  Self Yes No   Sig: Take 2,000 Units by mouth daily   Magnesium 500 MG CAPS  Self Yes No   Sig: Take by mouth Every other week   Turmeric Curcumin 500 MG CAPS  Self Yes No   Sig: Take by mouth daily   Zinc 30 MG CAPS  Self Yes No   Sig: Take by mouth Every other week   dicyclomine (BENTYL) 20 mg tablet   No No   Sig: Take 1 tablet (20 mg total) by mouth every 6 (six) hours   fenofibrate (TRICOR) 145 mg tablet   No No   Sig: Take 1 tablet (145 mg total) by mouth daily   fluticasone (FLONASE) 50 mcg/act nasal spray  Self Yes No   Sig: daily   hydrocortisone-pramoxine (ANALPRAM-HC) 2.5-1 % rectal cream   No No   Sig: Apply topically 3 (three) times a day   loratadine (CLARITIN) 10 mg  tablet  Self Yes No   Sig: Take 10 mg by mouth daily   olopatadine HCl (PATADAY) 0.2 % opth drops  Self Yes No   Si drop both eyes daily   omega-3-acid ethyl esters (LOVAZA) 1 g capsule   No No   Sig: Take 2 capsules (2 g total) by mouth 2 (two) times a day      Facility-Administered Medications: None       Past Medical History:   Diagnosis Date    Asthma     Erectile dysfunction 2013    Urinary urgency        Past Surgical History:   Procedure Laterality Date    ANKLE SURGERY Right     CHOLECYSTECTOMY      COLONOSCOPY      EGD      VASECTOMY         Family History   Problem Relation Age of Onset    Fibromyalgia Mother     Dementia Father      I have reviewed and agree with the history as documented.    E-Cigarette/Vaping    E-Cigarette Use Never User      E-Cigarette/Vaping Substances    Nicotine No     THC No     CBD No     Flavoring No     Other No     Unknown No      Social History     Tobacco Use    Smoking status: Never     Passive exposure: Past    Smokeless tobacco: Never   Vaping Use    Vaping status: Never Used   Substance Use Topics    Alcohol use: Not Currently    Drug use: Never       Review of Systems   Constitutional:  Positive for diaphoresis and fever. Negative for chills.   HENT:  Negative for ear pain and sore throat.    Eyes:  Negative for pain and visual disturbance.   Respiratory:  Negative for cough and shortness of breath.    Cardiovascular:  Positive for chest pain. Negative for palpitations.   Gastrointestinal:  Positive for abdominal pain, constipation and nausea. Negative for blood in stool and vomiting.   Genitourinary:  Negative for dysuria, flank pain and hematuria.   Musculoskeletal:  Negative for arthralgias and back pain.   Skin:  Negative for color change and rash.   Neurological:  Negative for seizures and syncope.   Psychiatric/Behavioral:  Negative for confusion.    All other systems reviewed and are negative.      Physical Exam  Physical Exam  Vitals and nursing note  reviewed.   Constitutional:       General: He is not in acute distress.     Appearance: He is well-developed.   HENT:      Head: Normocephalic and atraumatic.   Eyes:      Conjunctiva/sclera: Conjunctivae normal.   Cardiovascular:      Rate and Rhythm: Regular rhythm. Tachycardia present.      Heart sounds: No murmur heard.  Pulmonary:      Effort: Pulmonary effort is normal. No respiratory distress.      Breath sounds: Normal breath sounds.   Abdominal:      Palpations: Abdomen is soft.      Tenderness: There is generalized abdominal tenderness. There is no right CVA tenderness or left CVA tenderness.   Musculoskeletal:         General: No swelling.      Cervical back: Neck supple.   Skin:     General: Skin is warm and dry.      Capillary Refill: Capillary refill takes less than 2 seconds.   Neurological:      Mental Status: He is alert.   Psychiatric:         Mood and Affect: Mood normal.         Vital Signs  ED Triage Vitals [04/03/24 2004]   Temperature Pulse Respirations Blood Pressure SpO2   97.5 °F (36.4 °C) (!) 107 18 146/90 97 %      Temp Source Heart Rate Source Patient Position - Orthostatic VS BP Location FiO2 (%)   Temporal Monitor Sitting Left arm --      Pain Score       --           Vitals:    04/03/24 2004 04/03/24 2200   BP: 146/90    Pulse: (!) 107 98   Patient Position - Orthostatic VS: Sitting          Visual Acuity      ED Medications  Medications   lactated ringers infusion (has no administration in time range)   ondansetron (ZOFRAN) injection 4 mg (has no administration in time range)   heparin (porcine) subcutaneous injection 5,000 Units (has no administration in time range)   ceFAZolin (ANCEF) IVPB (premix in dextrose) 2,000 mg 50 mL (has no administration in time range)   metroNIDAZOLE (FLAGYL) IVPB (premix) 500 mg 100 mL (has no administration in time range)   morphine injection 2 mg (has no administration in time range)   sodium chloride 0.9 % bolus 1,000 mL (1,000 mL Intravenous New Bag  4/3/24 2105)   acetaminophen (Ofirmev) injection 1,000 mg (0 mg Intravenous Stopped 4/3/24 2202)   ondansetron (ZOFRAN) injection 4 mg (4 mg Intravenous Given 4/3/24 2118)   Famotidine (PF) (PEPCID) injection 20 mg (20 mg Intravenous Given 4/3/24 2147)   pantoprazole (PROTONIX) injection 40 mg (40 mg Intravenous Given 4/3/24 2118)   aluminum-magnesium hydroxide-simethicone (MAALOX) oral suspension 15 mL (15 mL Oral Given 4/3/24 2118)   iohexol (OMNIPAQUE) 350 MG/ML injection (MULTI-DOSE) 100 mL (100 mL Intravenous Given 4/3/24 2049)       Diagnostic Studies  Results Reviewed       Procedure Component Value Units Date/Time    Platelet count [[id number]]     Lab Status: No result Specimen: Blood     UA w Reflex to Microscopic w Reflex to Culture [[id number]]  (Abnormal) Collected: 04/03/24 2203    Lab Status: Final result Specimen: Urine, Clean Catch Updated: 04/03/24 2223     Color, UA Colorless     Clarity, UA Clear     Specific Gravity, UA >=1.050     pH, UA 7.0     Leukocytes, UA Negative     Nitrite, UA Negative     Protein, UA Negative mg/dl      Glucose, UA Negative mg/dl      Ketones, UA Negative mg/dl      Urobilinogen, UA <2.0 mg/dl      Bilirubin, UA Negative     Occult Blood, UA Negative    FLU/RSV/COVID - if FLU/RSV clinically relevant [[id number]]  (Normal) Collected: 04/03/24 2115    Lab Status: Final result Specimen: Nares from Nose Updated: 04/03/24 2211     SARS-CoV-2 Negative     INFLUENZA A PCR Negative     INFLUENZA B PCR Negative     RSV PCR Negative    Narrative:      FOR PEDIATRIC PATIENTS - copy/paste COVID Guidelines URL to browser: [email or web address]    SARS-CoV-2 assay is a Nucleic Acid Amplification assay intended for the  qualitative detection of nucleic acid from SARS-CoV-2 in nasopharyngeal  swabs. Results are for the presumptive identification of SARS-CoV-2 RNA.    Positive results are indicative of infection with SARS-CoV-2, the  virus  causing COVID-19, but do not rule out bacterial infection or co-infection  with other viruses. Laboratories within the [place] and its  territories are required to report all positive results to the appropriate  public health authorities. Negative results do not preclude SARS-CoV-2  infection and should not be used as the sole basis for treatment or other  patient management decisions. Negative results must be combined with  clinical observations, patient history, and epidemiological information.  This test has not been FDA cleared or approved.    This test has been authorized by FDA under an Emergency Use Authorization  (EUA). This test is only authorized for the duration of time the  declaration that circumstances exist justifying the authorization of the  emergency use of an in vitro diagnostic tests for detection of SARS-CoV-2  virus and/or diagnosis of COVID-19 infection under section 564(b)(1) of  the Act, 21 U.S.C. 360bbb-3(b)(1), unless the authorization is terminated  or revoked sooner. The test has been validated but independent review by FDA  and CLIA is pending.    Test performed using [employer] Gene[employer]pert: This RT-PCR assay targets N2,  a region unique to SARS-CoV-2. A conserved region in the E-gene was chosen  for pan-Sarbecovirus detection which includes SARS-CoV-2.    According to CMS-2020-01-R, this platform meets the definition of high-throughput technology.    Lactic acid, plasma (w/reflex if result > 2.0) [[id number]]  (Abnormal) Collected: 04/03/24 2105    Lab Status: Final result Specimen: Blood from Arm, Right Updated: 04/03/24 2137     LACTIC ACID 2.4 mmol/L     Narrative:      Result may be elevated if tourniquet was used during collection.    Lactic acid 2 Hours [[id number]]     Lab Status: No result Specimen: Blood     HS Troponin I 2hr [[id number]]     Lab Status: No result Specimen: Blood     HS Troponin 0hr (reflex protocol) [[id number]]  (Normal) Collected: 04/03/24 2105    Lab  Status: Final result Specimen: Blood from Arm, Right Updated: 04/03/24 2133     hs TnI 0hr 5 ng/L     B-Type Natriuretic Peptide(BNP) [[id number]]  (Normal) Collected: 04/03/24 2105    Lab Status: Final result Specimen: Blood from Arm, Right Updated: 04/03/24 2132     BNP 8 pg/mL     Comprehensive metabolic panel [[id number]]  (Abnormal) Collected: 04/03/24 2007    Lab Status: Final result Specimen: Blood from Arm, Left Updated: 04/03/24 2026     Sodium 136 mmol/L      Potassium 3.8 mmol/L      Chloride 104 mmol/L      CO2 23 mmol/L      ANION GAP 9 mmol/L      BUN 18 mg/dL      Creatinine 0.96 mg/dL      Glucose 179 mg/dL      Calcium 9.1 mg/dL      AST 32 U/L      ALT 61 U/L      Alkaline Phosphatase 49 U/L      Total Protein 6.5 g/dL      Albumin 4.2 g/dL      Total Bilirubin 0.51 mg/dL      eGFR 91 ml/min/1.73sq m     Narrative:      National Kidney Disease Foundation guidelines for Chronic Kidney Disease (CKD):     Stage 1 with normal or high GFR (GFR > 90 mL/min/1.73 square meters)    Stage 2 Mild CKD (GFR = 60-89 mL/min/1.73 square meters)    Stage 3A Moderate CKD (GFR = 45-59 mL/min/1.73 square meters)    Stage 3B Moderate CKD (GFR = 30-44 mL/min/1.73 square meters)    Stage 4 Severe CKD (GFR = 15-29 mL/min/1.73 square meters)    Stage 5 End Stage CKD (GFR <15 mL/min/1.73 square meters)  Note: GFR calculation is accurate only with a steady state creatinine    Lipase [[id number]]  (Normal) Collected: 04/03/24 2007    Lab Status: Final result Specimen: Blood from Arm, Left Updated: 04/03/24 2026     Lipase 27 u/L     CBC and differential [[id number]]  (Abnormal) Collected: 04/03/24 2007    Lab Status: Final result Specimen: Blood from Arm, Left Updated: 04/03/24 2012     WBC 14.07 Thousand/uL      RBC 4.80 Million/uL      Hemoglobin 15.4 g/dL      Hematocrit 42.2 %      MCV 88 fL      MCH 32.1 pg      MCHC 36.5 g/dL      RDW 11.8 %      MPV 8.5 fL      Platelets 195 Thousands/uL      nRBC 0 /100 WBCs       Neutrophils Relative 89 %      Immature Grans % 0 %      Lymphocytes Relative 4 %      Monocytes Relative 7 %      Eosinophils Relative 0 %      Basophils Relative 0 %      Neutrophils Absolute 12.41 Thousands/µL      Absolute Immature Grans 0.06 Thousand/uL      Absolute Lymphocytes 0.52 Thousands/µL      Absolute Monocytes 1.02 Thousand/µL      Eosinophils Absolute 0.03 Thousand/µL      Basophils Absolute 0.03 Thousands/µL                    XR chest 2 views   ED Interpretation by Akila Burton PA-C (04/03 2116)   No acute cardiopulmonary abnormality      CT abdomen pelvis with contrast   Final Result by Whit Gage MD (04/03 2215)      Acute uncomplicated appendicitis         Workstation performed: [id number]                    Procedures  ECG 12 Lead Documentation Only    Date/Time: 4/3/2024 9:15 PM    Performed by: Akila Burton PA-C  Authorized by: Akila Burton PA-C    Indications / Diagnosis:  Cardiac work-up  ECG reviewed by me, the ED Provider: yes    Patient location:  ED  Interpretation:     Interpretation: normal    Rate:     ECG rate:  99    ECG rate assessment: normal    Rhythm:     Rhythm: sinus rhythm    ST segments:     ST segments:  Normal  T waves:     T waves: non-specific             ED Course  ED Course as of 04/03/24 2248   Wed Apr 03, 2024 2031 WBC(!): 14.07   2031 ALT(!): 61   2031 GLUCOSE(!): 179   2137 LACTIC ACID(!!): 2.4   2138 hs TnI 0hr: 5   2219 CT abdomen pelvis with contrast    IMPRESSION:     Acute uncomplicated appendicitis                  HEART Risk Score      Flowsheet Row Most Recent Value   Heart Score Risk Calculator    History 0 Filed at: 04/03/2024 2142   ECG 0 Filed at: 04/03/2024 2142   Age 0 Filed at: 04/03/2024 2142   Risk Factors 2 Filed at: 04/03/2024 2142   Troponin 0 Filed at: 04/03/2024 2142   HEART Score 2 Filed at: 04/03/2024 2142                                        Medical Decision Making  48-year-old male presenting for x1 day of left-sided  "abdominal pain, subjective fever, diaphoresis and nausea. Tachycardic on arrival 107 bpm, other vital signs stable in the ED. On my exam, tachycardia present with generalized abdominal tenderness. Lactic acid 2.4. WBC 14.07. 0 troponin 5, awaiting second troponin. ALT 61. Glucose 179. No other acute lab findings. CT scan showed, \"Acute uncomplicated appendicitis\". Symptom control given in the ED. Discussed with general surgery who will admit and plan for OR tomorrow. Discussed ED course and plan with patient.  Patient understands and consents to admission plan at this time.    Amount and/or Complexity of Data Reviewed  Labs: ordered. Decision-making details documented in ED Course.  Radiology: ordered and independent interpretation performed. Decision-making details documented in ED Course.    Risk  OTC drugs.  Prescription drug management.             Disposition  Final diagnoses:   Appendicitis, unspecified appendicitis type     Time reflects when diagnosis was documented in both MDM as applicable and the Disposition within this note       Time User Action Codes Description Comment    4/3/2024 10:40 PM Kenneth Sandhu Add [K37] Appendicitis, unspecified appendicitis type           ED Disposition       None          Follow-up Information    None         Patient's Medications   Discharge Prescriptions    No medications on file       No discharge procedures on file.    PDMP Review       None            ED Provider  Electronically Signed by             Akila Burton PA-C  04/03/24 9085    "

## 2024-04-04 NOTE — OP NOTE
OPERATIVE REPORT  PATIENT NAME: Chaim Bunn    :  1975  MRN: [id number]  Pt Location: [facility] OR ROOM 02    SURGERY DATE: 2024    Surgeons and Role:     * Tomy Sandhu DO - Primary     * Weston Gomez PA-C - Assisting    Preop Diagnosis:  Appendicitis, unspecified appendicitis type [K37]    Post-Op Diagnosis Codes:     * Appendicitis, unspecified appendicitis type [K37]    Procedure(s):  APPENDECTOMY LAPAROSCOPIC    Specimen(s):  ID Type Source Tests Collected by Time Destination   1 : Appendix Tissue Appendix TISSUE EXAM Tomy SandhuDO 2024 1150        Estimated Blood Loss:   Minimal    Drains:  * No LDAs found *    Anesthesia Type:   General    Operative Indications:  Appendicitis, unspecified appendicitis type [K37]    Operative Findings:  Appendix was acutely inflamed  Small amount of cloudy serous fluid around the appendix, this was suctioned and irrigated  Tip of the appendix appeared to be starting gangrenous changes    Complications:   None    Procedure and Technique:  The patient was seen again in Preoperative Holding.  All the risks, benefits, complications, treatment options, and expected outcomes were discussed with the patient and family at length. All questions were answered to satisfaction. There was concurrence with the proposed plan and informed consent was obtained. The site of surgery was properly noted/marked. The patient was taken to Operating Room, identified, and the procedure verified as laparoscopic appendectomy, possible open.    The patient was placed in the supine position on the operating room table.  The patient had received preoperative antibiotics and SCDs placed on the bilateral lower extremities.  Anesthesia was then induced and the patient was intubated. A Grace catheter was not placed as the patient voided prior to coming to the operating room.  The patient's left arm was tucked.    The abdomen was then prepped and draped in the usual sterile  Patient counseled on fall risk assessment and prevention at home and in public - verbalized understanding  Patient counseled on infection prevention by excellent hand hygiene - verbalized understanding     fashion using ChloraPrep.  A Time-Out was then performed with all involved present confirming the correct patient, procedure, antibiotics, and any additional concerns. A 1.5 centimeter supraumbilical incision was made in a transverse fashion using a #15 blade and the umbilical stalk was grasped and elevated.  The patient was noted to have a small umbilical hernia and the umbilicus was elevated off the fascia.  The fascial edges were grasped with Kocher clamps. Using a large blunt Catie clamp the peritoneum was entered under direct visualization.  A 12 mm port was then placed in the fascial opening and pneumoperitoneum was established.  Initial pressure upon establishing pneumoperitoneum was noted to be low.  An additional 5 mm port was then placed suprapubically in the midline under direct visualization.  An additional 5 mm port was then placed in the left lower quadrant in the midclavicular line under direct visualization.  The patient was then placed in Trendelenburg and left lateral decubitus position. The small intestines were retracted in the cephalad and left lateral direction away from the pelvis and right lower quadrant.  The appendix was noted to be going into the pelvis and acutely inflamed.  There was noted to be some fibrinous material around the appendix with a small amount of cloudy serous fluid.  This was suctioned and irrigated.  The appendix was then carefully dissected and freed from any surrounding structures.the tip of the appendix appeared to be starting gangrenous changes, there is no obvious perforation.  A window was made in the mesoappendix at the base of the appendix.  The appendix was then divided at the base using an Endo-GELY stapler with a blue load.  A vascular load Endo-GELY was used to transect the mesentery.  There was no evidence of bleeding or leakage after division of the appendix and mesoappendix.  A small amount of irrigation was instilled and suctioned out using the suction  .  The appendix was placed in the Endo-Catch bag and removed via the umbilical port.  The abdomen was desufflated and the umbilical fascial defect was closed with 0 Vicryl in an interrupted fashion using 5 stitches.  The abdomen was then reinsufflated and the fascial incision was inspected and noted to be hemostatic without any insufflation leakage.  The umbilicus was reattached to the fascia using interrupted 0 Vicryl.  The remaining 5 mm ports were removed and the abdomen was desufflated.  Local anesthesia infiltrated in the port sites using 0.25% Marcaine. The port sites were then closed using 4-0 Monocryl.  The abdomen was then cleansed and dried and Steri-Strips, 2 x 2, Tegaderm were used to cover the sites.    All instrument, sponge, and needle counts were noted to be correct at the conclusion of the case.  The patient was brought to the PACU in stable and satisfactory condition.     I was present for the entire procedure., A qualified resident physician was not available., and A physician assistant was required during the procedure for retraction, tissue handling, dissection and suturing.    Patient Disposition:  extubated and stable    This procedure was not performed to treat colon cancer through resection      SIGNATURE: Tomy Sandhu DO  DATE: April 4, 2024  TIME: 12:13 PM

## 2024-04-04 NOTE — ED NOTES
Pt ambulated to the bathroom with a steady gait. Pt was given an update on his treatment plan.     Jw Kidd RN  04/04/24 0840

## 2024-04-05 ENCOUNTER — TELEPHONE (OUTPATIENT)
Age: 49
End: 2024-04-05

## 2024-04-05 NOTE — TELEPHONE ENCOUNTER
LM to patient on telling him work note for his wife is done sent to his email that is in his chart. Any questions to call back the office gave number [phone].

## 2024-04-05 NOTE — TELEPHONE ENCOUNTER
Spouse  (Brandie) called to get a note for her employer, since she was with pt. Please have the note include 04/04 and 04/05. She will be returning to work on Monday 04/08/24. Please email the note to email on file.

## 2024-04-08 PROCEDURE — 88304 TISSUE EXAM BY PATHOLOGIST: CPT | Performed by: PATHOLOGY

## 2024-04-11 ENCOUNTER — TELEPHONE (OUTPATIENT)
Age: 49
End: 2024-04-11

## 2024-04-11 NOTE — TELEPHONE ENCOUNTER
Patient called in for a return to work note after surgery. He is planning to go back on Monday 4/15. He is asking to list any restrictions that might be needed at least until he is seen for his post op. Patient is okay with note being put into his mychart and can be reached at [phone] if there are any issues.

## 2024-04-17 ENCOUNTER — OFFICE VISIT (OUTPATIENT)
Dept: SURGERY | Facility: CLINIC | Age: 49
End: 2024-04-17

## 2024-04-17 VITALS
WEIGHT: 185.8 LBS | HEIGHT: 66 IN | RESPIRATION RATE: 18 BRPM | DIASTOLIC BLOOD PRESSURE: 78 MMHG | OXYGEN SATURATION: 97 % | HEART RATE: 71 BPM | BODY MASS INDEX: 29.86 KG/M2 | SYSTOLIC BLOOD PRESSURE: 136 MMHG | TEMPERATURE: 97.9 F

## 2024-04-17 DIAGNOSIS — K35.80 ACUTE APPENDICITIS, UNSPECIFIED ACUTE APPENDICITIS TYPE: Primary | ICD-10-CM

## 2024-04-17 PROCEDURE — 99024 POSTOP FOLLOW-UP VISIT: CPT | Performed by: STUDENT IN AN ORGANIZED HEALTH CARE EDUCATION/TRAINING PROGRAM

## 2024-04-17 NOTE — PROGRESS NOTES
Assessment/Plan:  48-year-old male status post laparoscopic appendectomy on 4//24  -Patient is tolerating a diet and having bowel function  -States he still has some soreness at the umbilicus but this is improving  -Laparoscopic incisions healing well without erythema induration or drainage  -Pathology report reviewed  -Patient has returned to work, patient is okay to return to work with restrictions of no heavy lifting greater than 15 to 20 pounds on 4/15/2024  -Okay to return to work with no restrictions on 5/1/2024  -Follow-up in office as needed    Pathology Results:  Final Diagnosis   A.  Appendix (appendectomy):     - Acute appendicitis and serositis; negative for malignancy.     I reviewed the pathology report and discussed the findings with the patient and their accompanying family or caregiver, if present. All questions were answered to satisfaction and the patient along with family or caregiver, if present, voiced understanding.     1. Acute appendicitis, unspecified acute appendicitis type               Subjective:      Patient ID: Chaim Bunn is a 48 y.o. male.    Triage Notes:    Patient is a 48-year-old male who presents to office for evaluation status post laparoscopic appendectomy.  He is tolerating a diet and having bowel function.  He states he still has some soreness at his umbilicus but this has been improving.        The following portions of the patient's history were reviewed and updated as appropriate: allergies, current medications, past family history, past medical history, past social history, past surgical history and problem list.    Review of Systems   Constitutional:  Negative for chills, fatigue and fever.   HENT:  Negative for congestion, hearing loss, rhinorrhea and sore throat.    Eyes:  Negative for pain and discharge.   Respiratory:  Negative for cough, chest tightness and shortness of breath.    Cardiovascular:  Negative for chest pain and palpitations.   Gastrointestinal:   "Positive for abdominal pain. Negative for constipation, diarrhea, nausea and vomiting.   Endocrine: Negative for cold intolerance and heat intolerance.   Genitourinary:  Negative for difficulty urinating and dysuria.   Musculoskeletal:  Negative for back pain and neck pain.   Skin:  Negative for color change and rash.   Allergic/Immunologic: Positive for environmental allergies and food allergies.   Neurological:  Negative for seizures and headaches.   Hematological:  Negative for adenopathy. Does not bruise/bleed easily.   Psychiatric/Behavioral:  Negative for confusion and hallucinations.          Objective:      /78 (BP Location: Right arm, Patient Position: Sitting, Cuff Size: Standard)   Pulse 71   Temp 97.9 °F (36.6 °C)   Resp 18   Ht 5' 6\" (1.676 m)   Wt 84.3 kg (185 lb 12.8 oz)   SpO2 97%   BMI 29.99 kg/m²     Below is the patient's most recent value for Albumin, ALT, AST, BUN, Calcium, Chloride, Cholesterol, CO2, Creatinine, GFR, Glucose, HDL, Hematocrit, Hemoglobin, Hemoglobin A1C, LDL, Magnesium, Phosphorus, Platelets, Potassium, PSA, Sodium, Triglycerides, and WBC.   Lab Results   Component Value Date    ALT 61 (H) 04/03/2024    AST 32 04/03/2024    BUN 18 04/03/2024    CALCIUM 9.1 04/03/2024     04/03/2024    CO2 23 04/03/2024    CREATININE 0.96 04/03/2024    HDL 32 (L) 12/21/2023    HCT 42.2 04/03/2024    HGB 15.4 04/03/2024    HGBA1C 5.3 08/01/2019     04/03/2024    K 3.8 04/03/2024    PSA 0.37 02/03/2020    TRIG 294 (H) 12/21/2023    WBC 14.07 (H) 04/03/2024     Note: for a comprehensive list of the patient's lab results, access the Results Review activity.     Physical Exam  Constitutional:       Appearance: Normal appearance.   HENT:      Head: Normocephalic and atraumatic.      Nose: Nose normal.   Eyes:      General: No scleral icterus.     Conjunctiva/sclera: Conjunctivae normal.   Cardiovascular:      Rate and Rhythm: Normal rate.   Pulmonary:      Effort: Pulmonary " effort is normal.   Abdominal:      General: There is no distension.      Palpations: Abdomen is soft.      Tenderness: There is no abdominal tenderness.      Comments: Laparoscopic incisions healing well without erythema induration or drainage   Musculoskeletal:         General: No signs of injury.   Skin:     General: Skin is warm.      Coloration: Skin is not jaundiced.   Neurological:      General: No focal deficit present.      Mental Status: He is alert and oriented to person, place, and time.   Psychiatric:         Mood and Affect: Mood normal.         Behavior: Behavior normal.

## 2024-05-18 DIAGNOSIS — J30.89 ENVIRONMENTAL AND SEASONAL ALLERGIES: Primary | ICD-10-CM

## 2024-05-18 DIAGNOSIS — K58.9 IRRITABLE BOWEL SYNDROME, UNSPECIFIED TYPE: ICD-10-CM

## 2024-05-20 RX ORDER — OLOPATADINE HYDROCHLORIDE 2 MG/ML
1 SOLUTION/ DROPS OPHTHALMIC DAILY PRN
Qty: 5 ML | Refills: 1 | Status: SHIPPED | OUTPATIENT
Start: 2024-05-20

## 2024-05-20 RX ORDER — HYDROCORTISONE ACETATE PRAMOXINE HCL 2.5; 1 G/100G; G/100G
CREAM TOPICAL 3 TIMES DAILY
Qty: 30 G | Refills: 0 | Status: SHIPPED | OUTPATIENT
Start: 2024-05-20 | End: 2024-06-19

## 2024-05-20 RX ORDER — FLUTICASONE PROPIONATE 50 MCG
1 SPRAY, SUSPENSION (ML) NASAL DAILY
Qty: 9 ML | Refills: 4 | Status: SHIPPED | OUTPATIENT
Start: 2024-05-20

## 2024-05-30 DIAGNOSIS — E78.2 MIXED HYPERLIPIDEMIA: ICD-10-CM

## 2024-05-31 RX ORDER — FENOFIBRATE 145 MG/1
145 TABLET, COATED ORAL DAILY
Qty: 90 TABLET | Refills: 1 | Status: SHIPPED | OUTPATIENT
Start: 2024-05-31

## 2024-06-11 ENCOUNTER — OFFICE VISIT (OUTPATIENT)
Dept: URGENT CARE | Facility: CLINIC | Age: 49
End: 2024-06-11
Payer: COMMERCIAL

## 2024-06-11 VITALS
OXYGEN SATURATION: 97 % | TEMPERATURE: 100.6 F | SYSTOLIC BLOOD PRESSURE: 148 MMHG | DIASTOLIC BLOOD PRESSURE: 80 MMHG | WEIGHT: 183 LBS | RESPIRATION RATE: 19 BRPM | HEIGHT: 66 IN | HEART RATE: 97 BPM | BODY MASS INDEX: 29.41 KG/M2

## 2024-06-11 DIAGNOSIS — S20.461A TICK BITE OF RIGHT BACK WALL OF THORAX, INITIAL ENCOUNTER: Primary | ICD-10-CM
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
## 2024-06-11 DIAGNOSIS — R21 RASH: ICD-10-CM

## 2024-06-11 PROCEDURE — 99204 OFFICE O/P NEW MOD 45 MIN: CPT | Performed by: PHYSICIAN ASSISTANT

## 2024-06-11 RX ORDER — DOXYCYCLINE 100 MG/1
100 TABLET ORAL 2 TIMES DAILY
Qty: 28 TABLET | Refills: 0 | Status: SHIPPED | OUTPATIENT
Start: 2024-06-11 | End: 2024-06-25

## 2024-06-11 NOTE — PATIENT INSTRUCTIONS
Discussed with patient that based on his rash it was most likely a tick that bit him.   Recommended treatment with doxycycline for 2 weeks and blood work for lyme.   Advised to follow up with his PCP after finishing the medication for follow up.     Follow up with PCP in 3-5 days.  Proceed to  ER if symptoms worsen.    If tests are performed, our office will contact you with results only if changes need to made to the care plan discussed with you at the visit. You can review your full results on [facility].

## 2024-06-11 NOTE — PROGRESS NOTES
[facility]        NAME: Chaim Bunn is a 48 y.o. male  : 1975    MRN: [id number]  DATE: 2024  TIME: 4:46 PM    Assessment and Plan   Tick bite of right back wall of thorax, initial encounter [S20.461A, W57.XXXA]  1. Tick bite of right back wall of thorax, initial encounter  doxycycline (ADOXA) 100 MG tablet    Lyme Total AB W Reflex to IGM/IGG      2. Rash  doxycycline (ADOXA) 100 MG tablet            Patient Instructions     Patient Instructions   Discussed with patient that based on his rash it was most likely a tick that bit him.   Recommended treatment with doxycycline for 2 weeks and blood work for lyme.   Advised to follow up with his PCP after finishing the medication for follow up.     Follow up with PCP in 3-5 days.  Proceed to  ER if symptoms worsen.    If tests are performed, our office will contact you with results only if changes need to made to the care plan discussed with you at the visit. [facility]      Chief Complaint     Chief Complaint   Patient presents with    Insect Bite     2 days ago bit by spider? Maybe, Hard underneath. Flu like since then.  Aspirin, Tylenol.         History of Present Illness       Insect Bite  This is a new problem. The current episode started yesterday. The problem has been unchanged. Associated symptoms include arthralgias, fatigue, a fever, myalgias and a rash. Pertinent negatives include no anorexia, chills, numbness or weakness. Associated symptoms comments: Joint pain. Nothing aggravates the symptoms. He has tried acetaminophen for the symptoms.       Review of Systems   Review of Systems   Constitutional:  Positive for fatigue and fever. Negative for chills.   Gastrointestinal:  Negative for anorexia.   Musculoskeletal:  Positive for arthralgias and myalgias.   Skin:  Positive for color change and rash. Negative for pallor and wound.   Neurological:  Negative for weakness and numbness.    All other systems reviewed and are negative.        Current Medications       Current Outpatient Medications:     Alpha-Lipoic Acid 600 MG TABS, Take by mouth daily, Disp: , Rfl:     Arnuity Ellipta 100 MCG/ACT AEPB inhaler, daily, Disp: , Rfl:     Cholecalciferol 50 MCG (2000 UT) TABS, Take 2,000 Units by mouth daily, Disp: , Rfl:     dicyclomine (BENTYL) 20 mg tablet, Take 1 tablet (20 mg total) by mouth every 6 (six) hours, Disp: 360 tablet, Rfl: 1    doxycycline (ADOXA) 100 MG tablet, Take 1 tablet (100 mg total) by mouth 2 (two) times a day for 14 days, Disp: 28 tablet, Rfl: 0    fenofibrate (TRICOR) 145 mg tablet, TAKE 1 TABLET DAILY, Disp: 90 tablet, Rfl: 1    fluticasone (FLONASE) 50 mcg/act nasal spray, 1 spray into each nostril daily, Disp: 9 mL, Rfl: 4    hydrocortisone-pramoxine (ANALPRAM-HC) 2.5-1 % rectal cream, Apply topically 3 (three) times a day, Disp: 30 g, Rfl: 0    loratadine (CLARITIN) 10 mg tablet, Take 10 mg by mouth daily, Disp: , Rfl:     Magnesium 500 MG CAPS, Take by mouth Every other week, Disp: , Rfl:     olopatadine HCl (PATADAY) 0.2 % opth drops, Administer 1 drop to both eyes daily as needed (Itchy) 1 drop both eyes daily, Disp: 5 mL, Rfl: 1    omega-3-acid ethyl esters (LOVAZA) 1 g capsule, Take 2 capsules (2 g total) by mouth 2 (two) times a day, Disp: 360 capsule, Rfl: 5    Turmeric Curcumin 500 MG CAPS, Take by mouth daily, Disp: , Rfl:     Zinc 30 MG CAPS, Take by mouth Every other week, Disp: , Rfl:     Current Allergies     Allergies as of 06/11/2024 - Reviewed 06/11/2024   Allergen Reaction Noted    Tilactase Diarrhea 11/29/2010    Codeine Abdominal Pain and Myalgia 09/09/2016    Latex Swelling 03/27/2008    Morphine and codeine Abdominal Pain 02/21/2007    Wheat bran - food allergy Diarrhea 10/14/2013            The following portions of the patient's history were reviewed and updated as appropriate: allergies, current medications, past family history, past medical  "history, past social history, past surgical history and problem list.     Past Medical History:   Diagnosis Date    Asthma     Erectile dysfunction 2013    Urinary urgency        Past Surgical History:   Procedure Laterality Date    ANKLE SURGERY Right     APPENDECTOMY LAPAROSCOPIC N/A 4/4/2024    Procedure: APPENDECTOMY LAPAROSCOPIC;  Surgeon: Tomy Sandhu DO;  Location: [facility];  Service: General    CHOLECYSTECTOMY      COLONOSCOPY      EGD      VASECTOMY         Family History   Problem Relation Age of Onset    Fibromyalgia Mother     Dementia Father          Medications have been verified.        Objective   /80   Pulse 97   Temp (!) 100.6 °F (38.1 °C)   Resp 19   Ht 5' 6\" (1.676 m)   Wt 83 kg (183 lb)   SpO2 97%   BMI 29.54 kg/m²        Physical Exam     Physical Exam  Vitals and nursing note reviewed.   Constitutional:       Appearance: Normal appearance.   Skin:     General: Skin is warm.      Capillary Refill: Capillary refill takes less than 2 seconds.      Comments: Large erythematous bullseye rash to the right upper thorax that is warm and tender to palpation.  Measuring approximately 8cm.   No obvious remaining tick parts identified.    Neurological:      General: No focal deficit present.      Mental Status: He is alert.   Psychiatric:         Mood and Affect: Mood normal.         Behavior: Behavior normal.                   "

## 2024-06-12 ENCOUNTER — APPOINTMENT (OUTPATIENT)
Dept: LAB | Facility: CLINIC | Age: 49
End: 2024-06-12
Payer: COMMERCIAL
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
## 2024-06-12 DIAGNOSIS — S20.461A TICK BITE OF RIGHT BACK WALL OF THORAX, INITIAL ENCOUNTER: ICD-10-CM

## 2024-06-12 PROCEDURE — 86618 LYME DISEASE ANTIBODY: CPT

## 2024-06-12 PROCEDURE — 36415 COLL VENOUS BLD VENIPUNCTURE: CPT

## 2024-06-13 ENCOUNTER — OFFICE VISIT (OUTPATIENT)
Dept: FAMILY MEDICINE CLINIC | Facility: CLINIC | Age: 49
End: 2024-06-13
Payer: COMMERCIAL

## 2024-06-13 VITALS
WEIGHT: 185 LBS | HEART RATE: 78 BPM | TEMPERATURE: 97.8 F | DIASTOLIC BLOOD PRESSURE: 102 MMHG | OXYGEN SATURATION: 99 % | HEIGHT: 66 IN | SYSTOLIC BLOOD PRESSURE: 142 MMHG | BODY MASS INDEX: 29.73 KG/M2
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
## 2024-06-13 DIAGNOSIS — A69.23 LYME ARTHRITIS (HCC): ICD-10-CM

## 2024-06-13 LAB — B BURGDOR IGG+IGM SER QL IA: NEGATIVE

## 2024-06-13 PROCEDURE — 99213 OFFICE O/P EST LOW 20 MIN: CPT | Performed by: STUDENT IN AN ORGANIZED HEALTH CARE EDUCATION/TRAINING PROGRAM

## 2024-06-13 NOTE — PROGRESS NOTES
Assessment/Plan:         Problem List Items Addressed This Visit    None  Visit Diagnoses     Tick bite, unspecified site, sequela    -  Primary    Lyme arthritis (HCC)            Conitnue doxy, nsaids and tylenol for symptom control. Contact GI for testing for cryptosporidium if GI symptoms persist after completing docy      Subjective:      Patient ID: Chaim Bunn is a 48 y.o. male.    Fever - 9 weeks to 74 years   The current episode started in the past 7 days. The problem occurs 2 to 4 times per day. The problem has been waxing and waning. The maximum temperature noted was 102 to 102.9 F. The temperature was taken using an oral thermometer. Associated symptoms include abdominal pain and headaches.   Risk factors: hx of cancer and occupational exposure    Risk factors: no contaminated food, no contaminated water, no immunosuppression, no recent sickness, no recent travel and no sick contacts        Tick bite 4 days ago. Had a bullseye rash. Seen at urgent care and prescribed doxycycline. Has rigors and chills last night. Took motrin today and no issues.    The following portions of the patient's history were reviewed and updated as appropriate:   Past Medical History:  He has a past medical history of Asthma, Erectile dysfunction (2013), and Urinary urgency.,  _______________________________________________________________________  Medical Problems:  does not have any pertinent problems on file.,  _______________________________________________________________________  Past Surgical History:   has a past surgical history that includes Vasectomy; Colonoscopy; EGD; Ankle surgery (Right); Cholecystectomy; and APPENDECTOMY LAPAROSCOPIC (N/A, 4/4/2024).,  _______________________________________________________________________  Family History:  family history includes Dementia in his father; Fibromyalgia in his mother.,  _______________________________________________________________________  Social History:    reports that he has never smoked. He has been exposed to tobacco smoke. He has never used smokeless tobacco. He reports current alcohol use. He reports that he does not use drugs.,  _______________________________________________________________________  Allergies:  is allergic to tilactase, codeine, latex, morphine and codeine, and wheat bran - food allergy..  _______________________________________________________________________  Current Outpatient Medications   Medication Sig Dispense Refill   • Alpha-Lipoic Acid 600 MG TABS Take by mouth daily     • Arnuity Ellipta 100 MCG/ACT AEPB inhaler daily     • Cholecalciferol 50 MCG (2000 UT) TABS Take 2,000 Units by mouth daily     • dicyclomine (BENTYL) 20 mg tablet Take 1 tablet (20 mg total) by mouth every 6 (six) hours 360 tablet 1   • doxycycline (ADOXA) 100 MG tablet Take 1 tablet (100 mg total) by mouth 2 (two) times a day for 14 days 28 tablet 0   • fenofibrate (TRICOR) 145 mg tablet TAKE 1 TABLET DAILY 90 tablet 1   • fluticasone (FLONASE) 50 mcg/act nasal spray 1 spray into each nostril daily 9 mL 4   • hydrocortisone-pramoxine (ANALPRAM-HC) 2.5-1 % rectal cream Apply topically 3 (three) times a day 30 g 0   • loratadine (CLARITIN) 10 mg tablet Take 10 mg by mouth daily     • Magnesium 500 MG CAPS Take by mouth Every other week     • olopatadine HCl (PATADAY) 0.2 % opth drops Administer 1 drop to both eyes daily as needed (Itchy) 1 drop both eyes daily 5 mL 1   • omega-3-acid ethyl esters (LOVAZA) 1 g capsule Take 2 capsules (2 g total) by mouth 2 (two) times a day 360 capsule 5   • Turmeric Curcumin 500 MG CAPS Take by mouth daily     • Zinc 30 MG CAPS Take by mouth Every other week       No current facility-administered medications for this visit.     _______________________________________________________________________  Review of Systems   Constitutional:  Positive for fever.   Gastrointestinal:  Positive for abdominal pain.   Musculoskeletal:  Positive  "for arthralgias and myalgias.   Neurological:  Positive for headaches.         Objective:  Vitals:    06/13/24 1530   BP: (!) 142/102   Pulse: 78   Temp: 97.8 °F (36.6 °C)   SpO2: 99%   Weight: 83.9 kg (185 lb)   Height: 5' 6\" (1.676 m)     Body mass index is 29.86 kg/m².     Physical Exam  Constitutional:       General: He is not in acute distress.     Appearance: He is not ill-appearing.   HENT:      Head: Normocephalic and atraumatic.      Right Ear: External ear normal.      Left Ear: External ear normal.      Nose: Nose normal. No congestion or rhinorrhea.      Mouth/Throat:      Mouth: Mucous membranes are moist.      Pharynx: Oropharynx is clear. No oropharyngeal exudate or posterior oropharyngeal erythema.   Eyes:      Extraocular Movements: Extraocular movements intact.      Conjunctiva/sclera: Conjunctivae normal.      Pupils: Pupils are equal, round, and reactive to light.   Cardiovascular:      Rate and Rhythm: Normal rate and regular rhythm.      Pulses: Normal pulses.      Heart sounds: No murmur heard.  Pulmonary:      Effort: Pulmonary effort is normal. No respiratory distress.      Breath sounds: Normal breath sounds. No wheezing.   Chest:      Chest wall: No tenderness.   Abdominal:      General: Bowel sounds are normal.      Palpations: Abdomen is soft.      Tenderness: There is no abdominal tenderness.   Musculoskeletal:         General: Normal range of motion.      Cervical back: Normal range of motion.   Skin:     General: Skin is warm and dry.      Capillary Refill: Capillary refill takes less than 2 seconds.      Findings: No rash.          Neurological:      General: No focal deficit present.      Mental Status: He is alert. Mental status is at baseline.         "

## 2024-08-13 DIAGNOSIS — K58.9 IRRITABLE BOWEL SYNDROME, UNSPECIFIED TYPE: ICD-10-CM

## 2024-08-14 DIAGNOSIS — E78.2 MIXED HYPERLIPIDEMIA: ICD-10-CM

## 2024-08-14 RX ORDER — HYDROCORTISONE ACETATE AND PRAMOXINE HYDROCHLORIDE 25; 10 MG/G; MG/G
1 CREAM TOPICAL 3 TIMES DAILY
Qty: 30 G | Refills: 1 | Status: SHIPPED | OUTPATIENT
Start: 2024-08-14

## 2024-08-15 DIAGNOSIS — E78.2 MIXED HYPERLIPIDEMIA: ICD-10-CM

## 2024-08-15 RX ORDER — OMEGA-3-ACID ETHYL ESTERS 1 G/1
2 CAPSULE, LIQUID FILLED ORAL 2 TIMES DAILY
Qty: 360 CAPSULE | Refills: 1 | Status: SHIPPED | OUTPATIENT
Start: 2024-08-15

## 2024-08-15 RX ORDER — OMEGA-3-ACID ETHYL ESTERS 1 G/1
2 CAPSULE, LIQUID FILLED ORAL 2 TIMES DAILY
Qty: 360 CAPSULE | Refills: 1 | Status: SHIPPED | OUTPATIENT
Start: 2024-08-15 | End: 2024-08-15 | Stop reason: SDUPTHER

## 2024-08-15 NOTE — TELEPHONE ENCOUNTER
Reason for call:   [x] Refill   [] Prior Auth  [x] Other: SCRIPT IS TOO EXPENSIVE AT [facility]. SEND OVER TO [facility]    Office:   [x] PCP/Provider - [id number]  [] Specialty/Provider -     Medication:       Pharmacy: [facility] - [address] [phone]     Does the patient have enough for 3 days?   [] Yes   [x] No - Send as HP to POD

## 2024-09-30 DIAGNOSIS — R10.32 LEFT LOWER QUADRANT ABDOMINAL PAIN: ICD-10-CM

## 2024-09-30 DIAGNOSIS — K58.9 IRRITABLE BOWEL SYNDROME, UNSPECIFIED TYPE: ICD-10-CM

## 2024-09-30 RX ORDER — DICYCLOMINE HCL 20 MG
20 TABLET ORAL EVERY 6 HOURS
Qty: 360 TABLET | Refills: 1 | Status: SHIPPED | OUTPATIENT
Start: 2024-09-30

## 2024-12-31 DIAGNOSIS — J30.89 ENVIRONMENTAL AND SEASONAL ALLERGIES: ICD-10-CM

## 2024-12-31 DIAGNOSIS — E78.2 MIXED HYPERLIPIDEMIA: ICD-10-CM

## 2024-12-31 RX ORDER — FENOFIBRATE 145 MG/1
145 TABLET, COATED ORAL DAILY
Qty: 90 TABLET | Refills: 0 | Status: SHIPPED | OUTPATIENT
Start: 2024-12-31

## 2025-01-02 RX ORDER — OLOPATADINE HYDROCHLORIDE 2 MG/ML
SOLUTION OPHTHALMIC
Qty: 5 ML | Refills: 6 | Status: SHIPPED | OUTPATIENT
Start: 2025-01-02

## 2025-01-22 ENCOUNTER — OCCMED (OUTPATIENT)
Dept: URGENT CARE | Facility: CLINIC | Age: 50
End: 2025-01-22
Payer: OTHER MISCELLANEOUS

## 2025-01-22 DIAGNOSIS — Z77.098 CHEMICAL EXPOSURE: Primary | ICD-10-CM

## 2025-01-22 PROCEDURE — 99284 EMERGENCY DEPT VISIT MOD MDM: CPT

## 2025-01-22 PROCEDURE — G0383 LEV 4 HOSP TYPE B ED VISIT: HCPCS

## 2025-01-27 ENCOUNTER — OCCMED (OUTPATIENT)
Dept: URGENT CARE | Facility: CLINIC | Age: 50
End: 2025-01-27
Payer: OTHER MISCELLANEOUS

## 2025-01-27 DIAGNOSIS — Z77.098 CHEMICAL EXPOSURE: Primary | ICD-10-CM

## 2025-01-27 PROCEDURE — 99213 OFFICE O/P EST LOW 20 MIN: CPT | Performed by: PHYSICIAN ASSISTANT

## (undated) DEVICE — DRAPE EQUIPMENT RF WAND

## (undated) DEVICE — CHLORAPREP HI-LITE 26ML ORANGE

## (undated) DEVICE — 4-PORT MANIFOLD: Brand: NEPTUNE 2

## (undated) DEVICE — TROCAR: Brand: KII FIOS FIRST ENTRY

## (undated) DEVICE — TROCAR: Brand: KII® SLEEVE

## (undated) DEVICE — LIGHT HANDLE COVER SLEEVE DISP BLUE STELLAR

## (undated) DEVICE — SUT VICRYL 0 UR-6 27 IN J603H

## (undated) DEVICE — INTENDED FOR TISSUE SEPARATION, AND OTHER PROCEDURES THAT REQUIRE A SHARP SURGICAL BLADE TO PUNCTURE OR CUT.: Brand: BARD-PARKER SAFETY BLADES SIZE 15, STERILE

## (undated) DEVICE — GLOVE INDICATOR PI UNDERGLOVE SZ 7 BLUE

## (undated) DEVICE — ENDOPATH ETS45 2.5MM RELOADS (VASCULAR/THIN): Brand: ENDOPATH

## (undated) DEVICE — ENDOPATH ETS-FLEX45 ARTICULATING ENDOSCOPIC LINEAR CUTTER, NO RELOAD: Brand: ENDOPATH

## (undated) DEVICE — 3M™ STERI-STRIP™ REINFORCED ADHESIVE SKIN CLOSURES, R1541, 1/4 IN X 3 IN (6 MM X 75 MM), 3 STRIPS/ENVELOPE: Brand: 3M™ STERI-STRIP™

## (undated) DEVICE — TUBING SMOKE EVAC W/FILTRATION DEVICE PLUMEPORT ACTIV

## (undated) DEVICE — ALLENTOWN LAP CHOLE APP PACK: Brand: CARDINAL HEALTH

## (undated) DEVICE — 3M™ STERI-STRIP™ REINFORCED ADHESIVE SKIN CLOSURES, R1546, 1/4 IN X 4 IN (6 MM X 100 MM), 10 STRIPS/ENVELOPE: Brand: 3M™ STERI-STRIP™

## (undated) DEVICE — GLOVE SRG BIOGEL 7

## (undated) DEVICE — 2, DISPOSABLE SUCTION/IRRIGATOR WITHOUT DISPOSABLE TIP: Brand: STRYKEFLOW

## (undated) DEVICE — SCD SEQUENTIAL COMPRESSION COMFORT SLEEVE MEDIUM KNEE LENGTH: Brand: KENDALL SCD

## (undated) DEVICE — [HIGH FLOW INSUFFLATOR,  DO NOT USE IF PACKAGE IS DAMAGED,  KEEP DRY,  KEEP AWAY FROM SUNLIGHT,  PROTECT FROM HEAT AND RADIOACTIVE SOURCES.]: Brand: PNEUMOSURE

## (undated) DEVICE — ELECTRODE LAP L WIRE E-Z CLEAN 33CM -0100

## (undated) DEVICE — 3M™ TEGADERM™ TRANSPARENT FILM DRESSING FRAME STYLE, 1624W, 2-3/8 IN X 2-3/4 IN (6 CM X 7 CM), 100/CT 4CT/CASE: Brand: 3M™ TEGADERM™

## (undated) DEVICE — PAD GROUNDING DUAL ADULT

## (undated) DEVICE — ETS45 RELOAD STANDARD 45MM: Brand: ENDOPATH

## (undated) DEVICE — NEPTUNE E-SEP SMOKE EVACUATION PENCIL, COATED, 70MM BLADE, PUSH BUTTON SWITCH: Brand: NEPTUNE E-SEP

## (undated) DEVICE — GAUZE SPONGES,8 PLY: Brand: CURITY

## (undated) DEVICE — SUT MONOCRYL 4-0 PS-2 18 IN Y496G

## (undated) DEVICE — TISSUE RETRIEVAL SYSTEM: Brand: INZII RETRIEVAL SYSTEM